# Patient Record
Sex: FEMALE | Race: WHITE | Employment: FULL TIME | URBAN - METROPOLITAN AREA
[De-identification: names, ages, dates, MRNs, and addresses within clinical notes are randomized per-mention and may not be internally consistent; named-entity substitution may affect disease eponyms.]

---

## 2017-01-30 ENCOUNTER — OFFICE VISIT (OUTPATIENT)
Dept: FAMILY MEDICINE CLINIC | Age: 27
End: 2017-01-30

## 2017-01-30 VITALS
RESPIRATION RATE: 16 BRPM | HEIGHT: 61 IN | DIASTOLIC BLOOD PRESSURE: 72 MMHG | TEMPERATURE: 98.1 F | WEIGHT: 127 LBS | HEART RATE: 75 BPM | SYSTOLIC BLOOD PRESSURE: 108 MMHG | BODY MASS INDEX: 23.98 KG/M2 | OXYGEN SATURATION: 97 %

## 2017-01-30 DIAGNOSIS — F41.1 GAD (GENERALIZED ANXIETY DISORDER): ICD-10-CM

## 2017-01-30 DIAGNOSIS — R07.9 CHEST PAIN, UNSPECIFIED TYPE: Primary | ICD-10-CM

## 2017-01-30 NOTE — PROGRESS NOTES
Chief Complaint   Patient presents with    Chest Pain     X3 months     1. Have you been to the ER, urgent care clinic since your last visit? Hospitalized since your last visit? Yes 3 weeks ago,[facility] ER,chest pain    2. Have you seen or consulted any other health care providers outside of the [address] [place] [address] since your last visit? Include any pap smears or colon screening.  No

## 2017-01-30 NOTE — PATIENT INSTRUCTIONS
Louise Holbrook MD, [address] [facility] [address]  [address] [facility],4Th Floor  [place], [address] [facility]  Phone: [phone]  Fax: [phone]    [address] [place] [address]  [address] [address],[address]  [place], [address] [place] [address] Street [facility]  Phone: [phone]  Fax: [phone]

## 2017-01-30 NOTE — MR AVS SNAPSHOT
Visit Information Date & Time Provider Department Dept. Phone Encounter #  
 1/30/2017  3:50 PM Benny Jones MD [address] [place] [address] [phone] [id number] Upcoming Health Maintenance Date Due  
 HPV AGE 9Y-34Y (1 of 3 - Female 3 Dose Series) 6/11/2001 DTaP/Tdap/Td series (1 - Tdap) 6/11/2011 PAP AKA CERVICAL CYTOLOGY 6/11/2011 INFLUENZA AGE 9 TO ADULT 8/1/2016 Allergies as of 1/30/2017  Review Complete On: 1/30/2017 By: Mary Rausch LPN Not on File Current Immunizations  Never Reviewed No immunizations on file. Not reviewed this visit You Were Diagnosed With   
  
 Codes Comments Chest pain, unspecified type    -  Primary ICD-10-CM: R07.9 ICD-9-CM: 786.50 Vitals BP Pulse Temp Resp Height(growth percentile) Weight(growth percentile) 108/72 (BP 1 Location: Left arm, BP Patient Position: Sitting) 75 98.1 °F (36.7 °C) (Oral) 16 5' 1.42\" (1.56 m) 127 lb (57.6 kg) SpO2 BMI OB Status Smoking Status 97% 23.67 kg/m2 Having regular periods Never Smoker BMI and BSA Data Body Mass Index Body Surface Area  
 23.67 kg/m 2 1.58 m 2 Preferred Pharmacy Pharmacy Name Phone [id number] [address] [facility] [phone] Your Updated Medication List  
  
   
This list is accurate as of: 1/30/17  5:16 PM.  Always use your most recent med list.  
  
  
  
  
 sertraline 25 mg tablet Commonly known as:  ZOLOFT Take 1 Tab by mouth daily. We Performed the Following AMB POC EKG ROUTINE W/ 12 LEADS, INTER & REP [22691 CPT(R)] Patient Instructions Quinn Fung MD, [place] [address] [address] Phone: [phone] Fax: [address] [facility] [address] [place], [address] [place] [address] Phone: [phone] Fax: [phone] Introducing [facility]! Armen Kapadia introduces Hitsbook patient portal. Now you can access parts of your medical record, email your doctor's office, and request medication refills online. 1. In your internet browser, go to [email or web address] 2. Click on the First Time User? Click Here link in the Sign In box. You will see the New Member Sign Up page. 3. Enter your Hitsbook Access Code exactly as it appears below. You will not need to use this code after youve completed the sign-up process. If you do not sign up before the expiration date, you must request a new code. · Hitsbook Access Code: [email or web address] Expires: 4/30/2017  5:16 PM 
 
4. Enter the last four digits of your Social Security Number (xxxx) and Date of Birth (mm/dd/yyyy) as indicated and click Submit. You will be taken to the next sign-up page. 5. Create a Hitsbook ID. This will be your Hitsbook login ID and cannot be changed, so think of one that is secure and easy to remember. 6. Create a Hitsbook password. You can change your password at any time. 7. Enter your Password Reset Question and Answer. This can be used at a later time if you forget your password. 8. Enter your e-mail address. You will receive e-mail notification when new information is available in [address] 9. Click Sign Up. You can now view and download portions of your medical record. 10. Click the Download Summary menu link to download a portable copy of your medical information. If you have questions, please visit the Frequently Asked Questions section of the Hitsbook website. Remember, Hitsbook is NOT to be used for urgent needs. For medical emergencies, dial 911. Now available from your iPhone and Android! Please provide this summary of care documentation to your next provider. Your primary care clinician is listed as Zechariah Locker. If you have any questions after today's visit, please call [phone].

## 2017-01-30 NOTE — PROGRESS NOTES
Subjective  Sina Olson is an 32 y.o. female . Patient presents for evaluation of chest pain. It has been chronic in nature, on going for several months. Patient has been evaluated here in clinic as well as at the ED. Evaluation has included EKG as well as labs and troponin which per patinet has been normal. She has also been evaluated by GI and states she did not have any abnormal findings. She denies symptoms of GERD. States that she tried prevacid before as instructed by another doctor and it did not help chest pain symptoms. States that she sees a chiropracter and sometimes after manipulations it feels better. Denies shortness of breath. Allergies - reviewed:   Not on File      Medications - reviewed:   Current Outpatient Prescriptions   Medication Sig    sertraline (ZOLOFT) 25 mg tablet Take 1 Tab by mouth daily. No current facility-administered medications for this visit. Past Medical History - reviewed:  Past Medical History   Diagnosis Date    Heart palpitations          Past Surgical History - reviewed:   History reviewed. No pertinent past surgical history. Social History - reviewed:  Social History     Social History    Marital status: SINGLE     Spouse name: N/A    Number of children: N/A    Years of education: N/A     Occupational History    Not on file.      Social History Main Topics    Smoking status: Never Smoker    Smokeless tobacco: Not on file    Alcohol use No    Drug use: No    Sexual activity: No     Other Topics Concern    Not on file     Social History Narrative         Family History - reviewed:  Family History   Problem Relation Age of Onset    No Known Problems Mother     No Known Problems Father     No Known Problems Sister     No Known Problems Brother     No Known Problems Maternal Grandmother     No Known Problems Maternal Grandfather     No Known Problems Paternal Grandmother     No Known Problems Paternal Grandfather Immunizations - reviewed: There is no immunization history on file for this patient. ROS  Review of Systems : negative unless highlighted  CONSTITUTIONAL: fevers. Chills. GI: no nausea or vomiting or abdominal pain   Cards: chest pain. No dyspnea      Physical Exam  Visit Vitals    /72 (BP 1 Location: Left arm, BP Patient Position: Sitting)    Pulse 75    Temp 98.1 °F (36.7 °C) (Oral)    Resp 16    Ht 5' 1.42\" (1.56 m)    Wt 127 lb (57.6 kg)    SpO2 97%    BMI 23.67 kg/m2       General appearance - NAD. Appropriately conversational  Mouth: Oral mucosa moist, no oral ulcers  Heart - Normal rate, regular rhythm. No m/r/r  Lungs - CTAB. No wheeze or rales   Abdomen - Soft, non tender. Non distended. Extremities - No LE edema. Distal pulses intact  Neuro - Sensation intact in left arm. MSK - ROM intact in upper and lower extremities. Strength 5/5 in upper extremities. Skin - normal coloration and normal turgor. No cyanosis, no rash. Assessment/Plan  1. Chest pain, unspecified type- Long standing history of reported chest pain. Patient has had multiple workups, including in office examinations and EKGs, EKGs in the ED as well as lab work, all of which has been normal. She also has been evaluated by GI and per patient, was told she did not have anything to be concerned about from a GI perspective. Etiology seems unclear but EKG has been normal in office, along with young age and lack of major risk factors it makes cardiac issues unlikely. Patient does have anxiety and has been compliant with zoloft. - EKG in office normal, no abnormalities  - Labs: will check ESR, CRP, to evaluate for musculoskeletal etiologies  - Will refer to Cardiology for further evaluation. May benefit from holter monitor.  - Number for cardiology (Dr. Coty Powers office) provided to patient in AVS as well. I discussed the aforementioned diagnoses with the patient as well as the plan of care.      Lori Crigler Mohit Medel MD  Family Medicine Resident  PGY 2

## 2017-01-31 ENCOUNTER — LAB ONLY (OUTPATIENT)
Dept: FAMILY MEDICINE CLINIC | Age: 27
End: 2017-01-31

## 2017-02-01 LAB
BUN SERPL-MCNC: 13 MG/DL (ref 6–20)
BUN/CREAT SERPL: 16 (ref 8–20)
CALCIUM SERPL-MCNC: 9.6 MG/DL (ref 8.7–10.2)
CHLORIDE SERPL-SCNC: 101 MMOL/L (ref 96–106)
CHOLEST SERPL-MCNC: 236 MG/DL (ref 100–199)
CO2 SERPL-SCNC: 22 MMOL/L (ref 18–29)
CREAT SERPL-MCNC: 0.8 MG/DL (ref 0.57–1)
CRP SERPL-MCNC: <0.3 MG/L (ref 0–4.9)
ERYTHROCYTE [DISTWIDTH] IN BLOOD BY AUTOMATED COUNT: 13 % (ref 12.3–15.4)
ERYTHROCYTE [SEDIMENTATION RATE] IN BLOOD BY WESTERGREN METHOD: 2 MM/HR (ref 0–32)
GLUCOSE SERPL-MCNC: 89 MG/DL (ref 65–99)
HCT VFR BLD AUTO: 40.7 % (ref 34–46.6)
HDLC SERPL-MCNC: 69 MG/DL
HGB BLD-MCNC: 13.7 G/DL (ref 11.1–15.9)
INTERPRETATION, 910389: NORMAL
LDLC SERPL CALC-MCNC: 153 MG/DL (ref 0–99)
MCH RBC QN AUTO: 30.1 PG (ref 26.6–33)
MCHC RBC AUTO-ENTMCNC: 33.7 G/DL (ref 31.5–35.7)
MCV RBC AUTO: 90 FL (ref 79–97)
PLATELET # BLD AUTO: 350 X10E3/UL (ref 150–379)
POTASSIUM SERPL-SCNC: 4.5 MMOL/L (ref 3.5–5.2)
RBC # BLD AUTO: 4.55 X10E6/UL (ref 3.77–5.28)
SODIUM SERPL-SCNC: 139 MMOL/L (ref 134–144)
TRIGL SERPL-MCNC: 69 MG/DL (ref 0–149)
VLDLC SERPL CALC-MCNC: 14 MG/DL (ref 5–40)
WBC # BLD AUTO: 8.1 X10E3/UL (ref 3.4–10.8)

## 2017-02-08 NOTE — PROGRESS NOTES
Labs reviewed. Voicemail left for patient--okay via her from last visit. Everything is normal with exception of hypercholesterolemia: ASCVD 10 year risk is still extremely low at only 0.1%. Labs reassuring regarding overall health and concern for chest pain; however she should keep cardiology follow up and make appt to be seen her after that appointment.

## 2017-02-16 ENCOUNTER — TELEPHONE (OUTPATIENT)
Dept: FAMILY MEDICINE CLINIC | Age: 27
End: 2017-02-16

## 2017-02-16 NOTE — TELEPHONE ENCOUNTER
Per call from Nik Hernandez with Cardiology of [facility] [address] [facility],  appt is 2/17/17 at 7:00 am    Need notes/labs/EKG    Dr. Shabnam Padilla    Fax [phone]      Referral order on file          Nik Hernandez notes that [facility] and they didn't accept her insurance, but they do    [facility]    ID [id number]  GROUP [id number]

## 2017-03-03 DIAGNOSIS — F41.9 ANXIETY: ICD-10-CM

## 2017-03-03 RX ORDER — SERTRALINE HYDROCHLORIDE 25 MG/1
TABLET, FILM COATED ORAL
Qty: 30 TAB | Refills: 5 | Status: SHIPPED | OUTPATIENT
Start: 2017-03-03 | End: 2017-08-02 | Stop reason: SDUPTHER

## 2017-08-02 DIAGNOSIS — F41.9 ANXIETY: ICD-10-CM

## 2017-08-02 RX ORDER — SERTRALINE HYDROCHLORIDE 25 MG/1
TABLET, FILM COATED ORAL
Qty: 30 TAB | Refills: 0 | Status: SHIPPED | OUTPATIENT
Start: 2017-08-02 | End: 2017-08-22 | Stop reason: SDUPTHER

## 2017-08-02 NOTE — TELEPHONE ENCOUNTER
Patient is overdue for follow up of anxiety and depression. Plese have her make an appointment with a provider here.

## 2017-08-22 ENCOUNTER — OFFICE VISIT (OUTPATIENT)
Dept: FAMILY MEDICINE CLINIC | Age: 27
End: 2017-08-22

## 2017-08-22 VITALS
BODY MASS INDEX: 28.7 KG/M2 | TEMPERATURE: 98 F | HEIGHT: 61 IN | WEIGHT: 152 LBS | HEART RATE: 65 BPM | RESPIRATION RATE: 16 BRPM | SYSTOLIC BLOOD PRESSURE: 134 MMHG | OXYGEN SATURATION: 100 % | DIASTOLIC BLOOD PRESSURE: 74 MMHG

## 2017-08-22 DIAGNOSIS — F41.1 GAD (GENERALIZED ANXIETY DISORDER): Primary | ICD-10-CM

## 2017-08-22 DIAGNOSIS — F40.00 AGORAPHOBIA: ICD-10-CM

## 2017-08-22 RX ORDER — SERTRALINE HYDROCHLORIDE 25 MG/1
TABLET, FILM COATED ORAL
Qty: 90 TAB | Refills: 2 | Status: SHIPPED | OUTPATIENT
Start: 2017-08-22 | End: 2018-07-23 | Stop reason: SDUPTHER

## 2017-08-22 NOTE — PATIENT INSTRUCTIONS
Social Phobia: Care Instructions  Your Care Instructions  Social phobia causes a fear of social situations. It is also called social anxiety disorder. People with this condition have trouble talking or meeting with people. They may have a hard time performing in front of others. They worry that they will embarrass themselves. And they worry that others will  them and think poorly of them. Social phobia is not the same as being shy. Nor is it the same as a normal nervous reaction to public speaking. It causes a much higher level of fear. It often starts days or weeks before an event. This condition often triggers symptoms such as blushing, sweating, shakiness, fast heartbeat, and trouble thinking. It can make you feel anxious, sad, cranky or grumpy. You may be easily startled before or during a social event. You may worry or fear that something bad is going to happen. Social phobia can have a strong effect on your daily life. It may even cause you to withdraw from social settings. This can lead you to miss work or school. Social phobia can be treated with medicine and counseling. Follow-up care is a key part of your treatment and safety. Be sure to make and go to all appointments, and call your doctor if you are having problems. It's also a good idea to know your test results and keep a list of the medicines you take. How can you care for yourself at home? · Find a counselor you like and trust. Talk openly and honestly about your problems. Be willing to make some changes. · Be safe with medicines. Take your medicines exactly as prescribed. Call your doctor if you have any problems with your medicine. When you feel good, you may think you do not need your medicine, but it is important to keep taking it. · You may be able to reduce your phobia at home by practicing a healthy lifestyle. ¨ Get at least 30 minutes of exercise on most days of the week. Walking is a good choice.  You also may want to do other activities, such as running, swimming, cycling, or playing tennis or team sports. ¨ Go to bed at nearly the same time every night. And keep your room quiet and dark. This will reduce distractions and help you get a good night's rest.  ¨ Eat a balanced diet by choosing foods low in fat and high in fiber. ¨ Avoid food and drinks that contain caffeine, such as chocolate and coffee. Caffeine may make your phobia worse. ¨ Try some relaxation exercises. Certain breathing exercises and muscle relaxation exercises help reduce anxiety. · Stay active. Try to do the things you usually enjoy doing, even if you don't feel like doing them. · Discuss the cause of your fears with a good friend or family member. Or join a support group for people with problems like yours. Sharing feelings with others sometimes relieves fear. · When you start to feel fearful, do something to get your mind off it, such as taking a walk. · Trust that you can improve your way of coping with these fears. You can feel better. What should you do if someone in your family has a phobia? · Learn about the phobia and signs that symptoms are getting worse. · Remind your family member of your love. Speak honestly with him or her. · Make a plan with all family members about how to take care of your loved one when his or her fears are bad. Talk about your concerns and those of other family members. · Do not focus attention only on the family member who is in treatment. · Remind yourself that it will take time for changes to occur. · Do not blame yourself for his or her condition. · Know your legal rights and the legal rights of your family member. · Take care of yourself. Stay involved with your own interests, such as your career, hobbies, and friends. · Use exercise, positive self-talk, relaxation, and deep breathing exercises to help lower your stress.   · If you are having a hard time with your feelings and your interactions with your family member, talk with a counselor. When should you call for help? Call 911 anytime you think you may need emergency care. For example, call if:  · Someone you know is about to attempt or is attempting suicide. · You feel you cannot stop from hurting yourself or someone else. Call your doctor now or seek immediate medical care if:  · A person with a phobia mentions suicide. If a suicide threat seems real, with a specific plan and a way to carry it out, you or someone you trust should stay with the person until you get help. · Anxiety or irrational fear upsets your daily activities. · Sudden, severe attacks of fear or anxiety with physical symptoms (shaking, sweating) seem to occur for no reason. · You start to use illegal drugs or drink alcohol heavily. Watch closely for changes in your health, and be sure to contact your doctor if:  · You do not get better as expected. Where can you learn more? Go to http://benigno-enrique.info/. Enter 21 [phone] in the search box to learn more about \"Social Phobia: Care Instructions. \"  Current as of: July 26, 2016  Content Version: 11.3  © 4984-4205 [employer]. Care instructions adapted under license by [employer] (which disclaims liability or warranty for this information). If you have questions about a medical condition or this instruction, always ask your healthcare professional. [address] any warranty or liability for your use of this information.

## 2017-08-22 NOTE — PROGRESS NOTES
Subjective  Mary Grace Farrell is an 32 y.o. female . Patient presents for follow up depression/MARYAM, . She is on sertraline 25mg daily. She feels this has really helped with her mode. No problem with HI/SI. Sleep: better sleep  Interest: working for [employer]. So happy about having a job  Guilt: none  Energy: improved  Concentration: good  Appetite: improved \"gained weight actually\"  Psychomotor: none  Suicide: none    She feels that the zoloft has worked really well for her. She says her mood is much improved. She denies prominent anxiety. At times she does have social anxiety, but that has been long standing for her. She does not have any concerns about the medications and feels very hopeful about life overall. Allergies - reviewed:   No Known Allergies      Medications - reviewed:   Current Outpatient Prescriptions   Medication Sig    sertraline (ZOLOFT) 25 mg tablet TAKE 1 TABLET BY MOUTH EVERY DAY     No current facility-administered medications for this visit. Past Medical History - reviewed:  Past Medical History:   Diagnosis Date    Heart palpitations          Immunizations - reviewed: There is no immunization history on file for this patient. ROS  Review of Systems : A complete review of systems as performed and is negative except for those mentioned in the HPI. Physical Exam  Visit Vitals    /74    Pulse 65    Temp 98 °F (36.7 °C) (Oral)    Resp 16    Ht 5' 1.42\" (1.56 m)    Wt 152 lb (68.9 kg)    LMP 08/08/2017    SpO2 100%    BMI 28.33 kg/m2       General appearance - Alert, NAD. Respiratory - LCTAB. No wheeze/rale/rhonchi  Heart - Normal rate, regular rhythm. No m/r/r  Thyroid - non tender, non enlarged, no nodules  Skin - normal coloration and normal turgor. No cyanosis, no rash. Psych- affect \"happy\" mood is happy and pleasant as well. Speech non pressured. Non tangential. No HI/SI . Assessment/Plan  1.  MARYAM (generalized anxiety disorder): this dose seems to be working well for patient. No adjustments today. - continue zoloft 25mg  - Previously ordered one but patient did not complete as at that time she reported previously normal TSH in recent months. I do not see records of this lab result. Will have labs from that provider sent in her. 2. Agoraphobia: improved on zoloft. Continue. If worsens, then we could try buspar, but patient does not feel she needs it now. I discussed the aforementioned diagnoses with the patient as well as the plan of care.      Kayla Gaytan MD  Family Medicine Resident  PGY 3

## 2017-08-22 NOTE — MR AVS SNAPSHOT
Visit Information Date & Time Provider Department Dept. Phone Encounter #  
 8/22/2017  1:45 PM Minor Maria MD [address] [place] [address] [phone] [id number] Upcoming Health Maintenance Date Due DTaP/Tdap/Td series (1 - Tdap) 6/11/2011 PAP AKA CERVICAL CYTOLOGY 6/11/2011 INFLUENZA AGE 9 TO ADULT 8/1/2017 Allergies as of 8/22/2017  Review Complete On: 8/22/2017 By: Elsy Hill LPN No Known Allergies Current Immunizations  Never Reviewed No immunizations on file. Not reviewed this visit You Were Diagnosed With   
  
 Codes Comments MARYAM (generalized anxiety disorder)    -  Primary ICD-10-CM: F41.1 ICD-9-CM: 300.02 Agoraphobia     ICD-10-CM: F40.00 ICD-9-CM: 300.22 Vitals BP Pulse Temp Resp Height(growth percentile) Weight(growth percentile) 134/74 65 98 °F (36.7 °C) (Oral) 16 5' 1.42\" (1.56 m) 152 lb (68.9 kg) LMP SpO2 BMI OB Status Smoking Status 08/08/2017 100% 28.33 kg/m2 Having regular periods Never Smoker BMI and BSA Data Body Mass Index Body Surface Area  
 28.33 kg/m 2 1.73 m 2 Preferred Pharmacy Pharmacy Name Phone [id number]- [place], [address] AT [facility] [phone] Your Updated Medication List  
  
   
This list is accurate as of: 8/22/17  1:56 PM.  Always use your most recent med list.  
  
  
  
  
 sertraline 25 mg tablet Commonly known as:  ZOLOFT  
TAKE 1 TABLET BY MOUTH EVERY DAY Patient Instructions Social Phobia: Care Instructions Your Care Instructions Social phobia causes a fear of social situations. It is also called social anxiety disorder. People with this condition have trouble talking or meeting with people. They may have a hard time performing in front of others. They worry that they will embarrass themselves.  And they worry that others will  them and think poorly of them. Social phobia is not the same as being shy. Nor is it the same as a normal nervous reaction to public speaking. It causes a much higher level of fear. It often starts days or weeks before an event. This condition often triggers symptoms such as blushing, sweating, shakiness, fast heartbeat, and trouble thinking. It can make you feel anxious, sad, cranky or grumpy. You may be easily startled before or during a social event. You may worry or fear that something bad is going to happen. Social phobia can have a strong effect on your daily life. It may even cause you to withdraw from social settings. This can lead you to miss work or school. Social phobia can be treated with medicine and counseling. Follow-up care is a key part of your treatment and safety. Be sure to make and go to all appointments, and call your doctor if you are having problems. It's also a good idea to know your test results and keep a list of the medicines you take. How can you care for yourself at home? · Find a counselor you like and trust. Talk openly and honestly about your problems. Be willing to make some changes. · Be safe with medicines. Take your medicines exactly as prescribed. Call your doctor if you have any problems with your medicine. When you feel good, you may think you do not need your medicine, but it is important to keep taking it. · You may be able to reduce your phobia at home by practicing a healthy lifestyle. ¨ Get at least 30 minutes of exercise on most days of the week. Walking is a good choice. You also may want to do other activities, such as running, swimming, cycling, or playing tennis or team sports. ¨ Go to bed at nearly the same time every night. And keep your room quiet and dark. This will reduce distractions and help you get a good night's rest. 
¨ Eat a balanced diet by choosing foods low in fat and high in fiber. ¨ Avoid food and drinks that contain caffeine, such as chocolate and coffee. Caffeine may make your phobia worse. ¨ Try some relaxation exercises. Certain breathing exercises and muscle relaxation exercises help reduce anxiety. · Stay active. Try to do the things you usually enjoy doing, even if you don't feel like doing them. · Discuss the cause of your fears with a good friend or family member. Or join a support group for people with problems like yours. Sharing feelings with others sometimes relieves fear. · When you start to feel fearful, do something to get your mind off it, such as taking a walk. · Trust that you can improve your way of coping with these fears. You can feel better. What should you do if someone in your family has a phobia? · Learn about the phobia and signs that symptoms are getting worse. · Remind your family member of your love. Speak honestly with him or her. · Make a plan with all family members about how to take care of your loved one when his or her fears are bad. Talk about your concerns and those of other family members. · Do not focus attention only on the family member who is in treatment. · Remind yourself that it will take time for changes to occur. · Do not blame yourself for his or her condition. · Know your legal rights and the legal rights of your family member. · Take care of yourself. Stay involved with your own interests, such as your career, hobbies, and friends. · Use exercise, positive self-talk, relaxation, and deep breathing exercises to help lower your stress. · If you are having a hard time with your feelings and your interactions with your family member, talk with a counselor. When should you call for help? Call 911 anytime you think you may need emergency care. For example, call if: 
· Someone you know is about to attempt or is attempting suicide. · You feel you cannot stop from hurting yourself or someone else. Call your doctor now or seek immediate medical care if: · A person with a phobia mentions suicide. If a suicide threat seems real, with a specific plan and a way to carry it out, you or someone you trust should stay with the person until you get help. · Anxiety or irrational fear upsets your daily activities. · Sudden, severe attacks of fear or anxiety with physical symptoms (shaking, sweating) seem to occur for no reason. · You start to use illegal drugs or drink alcohol heavily. Watch closely for changes in your health, and be sure to contact your doctor if: 
· You do not get better as expected. Where can you learn more? Go to http://benignoZamzeeenrique.info/. Enter 21 [phone] in the search box to learn more about \"Social Phobia: Care Instructions. \" Current as of: July 26, 2016 Content Version: 11.3 © 0125-3650 [employer]. Care instructions adapted under license by [employer] (which disclaims liability or warranty for this information). If you have questions about a medical condition or this instruction, always ask your healthcare professional. [address] any warranty or liability for your use of this information. Introducing [facility]! Jayro Hope introduces [employer] patient portal. Now you can access parts of your medical record, email your doctor's office, and request medication refills online. 1. In your internet browser, go to [email or web address] 2. Click on the First Time User? Click Here link in the Sign In box. You will see the New Member Sign Up page. 3. Enter your [employer] Access Code exactly as it appears below. You will not need to use this code after youve completed the sign-up process. If you do not sign up before the expiration date, you must request a new code. · [employer] Access Code: [email or web address] Expires: 11/20/2017  1:56 PM 
 
 4. Enter the last four digits of your Social Security Number (xxxx) and Date of Birth (mm/dd/yyyy) as indicated and click Submit. You will be taken to the next sign-up page. 5. Create a [employer] ID. This will be your [employer] login ID and cannot be changed, so think of one that is secure and easy to remember. 6. Create a [employer] password. You can change your password at any time. 7. Enter your Password Reset Question and Answer. This can be used at a later time if you forget your password. 8. Enter your e-mail address. You will receive e-mail notification when new information is available in [address] 9. Click Sign Up. You can now view and download portions of your medical record. 10. Click the Download Summary menu link to download a portable copy of your medical information. If you have questions, please visit the Frequently Asked Questions section of the [employer] website. Remember, [employer] is NOT to be used for urgent needs. For medical emergencies, dial 911. Now available from your iPhone and Android! Please provide this summary of care documentation to your next provider. Your primary care clinician is listed as Wayne Velasquez. If you have any questions after today's visit, please call [phone].

## 2017-11-29 ENCOUNTER — OFFICE VISIT (OUTPATIENT)
Dept: FAMILY MEDICINE CLINIC | Age: 27
End: 2017-11-29

## 2017-11-29 VITALS
OXYGEN SATURATION: 100 % | TEMPERATURE: 98.1 F | RESPIRATION RATE: 18 BRPM | HEIGHT: 61 IN | BODY MASS INDEX: 29.45 KG/M2 | HEART RATE: 69 BPM | SYSTOLIC BLOOD PRESSURE: 111 MMHG | WEIGHT: 156 LBS | DIASTOLIC BLOOD PRESSURE: 79 MMHG

## 2017-11-29 DIAGNOSIS — M72.2 PLANTAR FASCIITIS OF RIGHT FOOT: Primary | ICD-10-CM

## 2017-11-29 RX ORDER — IBUPROFEN 600 MG/1
600 TABLET ORAL 3 TIMES DAILY
Qty: 63 TAB | Refills: 1 | Status: SHIPPED | OUTPATIENT
Start: 2017-11-29

## 2017-11-29 NOTE — PROGRESS NOTES
HPI       Chief Complaint   Patient presents with    Foot Swelling     Right foot,X2 weeks, numbness     She is a 32 y.o. female who presents for evalution. 2 weeks of foot pain, bilateral but mainly on the right side. Pain is in the heel, radiates forwards. She relates that she just started a new job 2 weeks ago and is now on her feet ~12 hours a day where before she was not on her feet as much. She also relates she is wearing cheap black shoes. Pain is bad when she gets up out of bed in the morning. Has tried heat and 400 mg Ibuprofen 1-2x per day, nothing else. Yesterday she feels her heel was a little swollen. Review of Systems - No fevers, chills, nausea, vomiting. Reviewed PmHx, RxHx, FmHx, SocHx, AllgHx and updated and dated in the chart. Physical Exam:  Visit Vitals    /79 (BP 1 Location: Right arm, BP Patient Position: Sitting)    Pulse 69    Temp 98.1 °F (36.7 °C) (Oral)    Resp 18    Ht 5' 1.42\" (1.56 m)    Wt 156 lb (70.8 kg)    SpO2 100%    BMI 29.07 kg/m2     Physical Exam   Constitutional: She is oriented to person, place, and time. She appears well-developed and well-nourished. HENT:   Head: Normocephalic and atraumatic. Nose: Nose normal.   Eyes: Conjunctivae are normal. Right eye exhibits no discharge. Left eye exhibits no discharge. No scleral icterus. Cardiovascular: Normal rate, regular rhythm and normal heart sounds. Exam reveals no gallop and no friction rub. Pulmonary/Chest: Effort normal and breath sounds normal. No respiratory distress. Abdominal: Soft. Bowel sounds are normal. She exhibits no distension. There is no tenderness. Musculoskeletal: Normal range of motion. She exhibits tenderness (Heels mildly tender anteriorly bilaterally, more on R.). She exhibits no edema or deformity. DP/PT pulses normal B/L. Neurological: She is alert and oriented to person, place, and time. Skin: Skin is warm and dry. No rash noted.  She is not diaphoretic. No erythema. No pallor. Psychiatric: She has a normal mood and affect. Judgment normal.   Vitals reviewed. Assessment / Plan     Diagnoses and all orders for this visit:    1. Plantar fasciitis of right foot  -     ibuprofen (MOTRIN) 600 mg tablet; Take 1 Tab by mouth three (3) times daily.   - Discussed better shoes. - Ice   - No walking at home with bare feet. - Printout with exercises. Follow-up Disposition:  Return if symptoms worsen or fail to improve. I have discussed the diagnosis with the patient and the intended plan as seen in the above orders. The patient has received an after-visit summary and questions were answered concerning future plans. I have discussed medication side effects and warnings with the patient as well.     Alyson Valdez MD  Family Medicine Resident

## 2017-11-29 NOTE — PATIENT INSTRUCTIONS
1. Cold  2. Ibuprofen three times a day for 2 weeks then as needed after. 3. Very supportive shoes like Danscos, available at medical places that sell scrubs, etc.   4. Exercises printed below. 5. No walking in bare feet - thick slippers at home, etc. (except when showering)     Plantar Fasciitis: Exercises  Your Care Instructions  Here are some examples of typical rehabilitation exercises for your condition. Start each exercise slowly. Ease off the exercise if you start to have pain. Your doctor or physical therapist will tell you when you can start these exercises and which ones will work best for you. How to do the exercises  Towel stretch    1. Sit with your legs extended and knees straight. 2. Place a towel around your foot just under the toes. 3. Hold each end of the towel in each hand, with your hands above your knees. 4. Pull back with the towel so that your foot stretches toward you. 5. Hold the position for at least 15 to 30 seconds. 6. Repeat 2 to 4 times a session, up to 5 sessions a day. Calf stretch    This exercise stretches the muscles at the back of the lower leg (the calf) and the Achilles tendon. Do this exercise 3 or 4 times a day, 5 days a week. 1. Stand facing a wall with your hands on the wall at about eye level. Put the leg you want to stretch about a step behind your other leg. 2. Keeping your back heel on the floor, bend your front knee until you feel a stretch in the back leg. 3. Hold the stretch for 15 to 30 seconds. Repeat 2 to 4 times. Plantar fascia and calf stretch    Stretching the plantar fascia and calf muscles can increase flexibility and decrease heel pain. You can do this exercise several times each day and before and after activity. 1. Stand on a step as shown above. Be sure to hold on to the banister. 2. Slowly let your heels down over the edge of the step as you relax your calf muscles.  You should feel a gentle stretch across the bottom of your foot and up the back of your leg to your knee. 3. Hold the stretch about 15 to 30 seconds, and then tighten your calf muscle a little to bring your heel back up to the level of the step. Repeat 2 to 4 times. Towel curls    Make this exercise more challenging by placing a weighted object, such as a soup can, on the other end of the towel. 1. While sitting, place your foot on a towel on the floor and scrunch the towel toward you with your toes. 2. Then, also using your toes, push the towel away from you. [place] pickups    1. Put marbles on the floor next to a cup.  2. Using your toes, try to lift the marbles up from the floor and put them in the cup. Follow-up care is a key part of your treatment and safety. Be sure to make and go to all appointments, and call your doctor if you are having problems. It's also a good idea to know your test results and keep a list of the medicines you take. Where can you learn more? Go to http://benigno-enrique.info/. Erik Pozo in the search box to learn more about \"Plantar Fasciitis: Exercises. \"  Current as of: March 21, 2017  Content Version: 11.4  © 9719-2338 [employer]. Care instructions adapted under license by [employer] (which disclaims liability or warranty for this information). If you have questions about a medical condition or this instruction, always ask your healthcare professional. [place] [address] any warranty or liability for your use of this information. Plantar Fasciitis: Care Instructions  Your Care Instructions    Plantar fasciitis is pain and inflammation of the plantar fascia, the tissue at the bottom of your foot that connects the heel bone to the toes. The plantar fascia also supports the arch. If you strain the plantar fascia, it can develop small tears and cause heel pain when you stand or walk. Plantar fasciitis can be caused by running or other sports.  It also may occur in people who are overweight or who have high arches or flat feet. You may get plantar fasciitis if you walk or stand for long periods, or have a tight Achilles tendon or calf muscles. You can improve your foot pain with rest and other care at home. It might take a few weeks to a few months for your foot to heal completely. Follow-up care is a key part of your treatment and safety. Be sure to make and go to all appointments, and call your doctor if you are having problems. It's also a good idea to know your test results and keep a list of the medicines you take. How can you care for yourself at home? · Rest your feet often. Reduce your activity to a level that lets you avoid pain. If possible, do not run or walk on hard surfaces. · Take pain medicines exactly as directed. ¨ If the doctor gave you a prescription medicine for pain, take it as prescribed. ¨ If you are not taking a prescription pain medicine, take an over-the-counter anti-inflammatory medicine for pain and swelling, such as ibuprofen (Advil, Motrin) or naproxen (Aleve). Read and follow all instructions on the label. · Use ice massage to help with pain and swelling. You can use an ice cube or an ice cup several times a day. To make an ice cup, fill a paper cup with water and freeze it. Cut off the top of the cup until a half-inch of ice shows. Hold onto the remaining paper to use the cup. Rub the ice in small circles over the area for 5 to 7 minutes. · Contrast baths, which alternate hot and cold water, can also help reduce swelling. But because heat alone may make pain and swelling worse, end a contrast bath with a soak in cold water. · Wear a night splint if your doctor suggests it. A night splint holds your foot with the toes pointed up and the foot and ankle at a 90-degree angle. This position gives the bottom of your foot a constant, gentle stretch.   · Do simple exercises such as calf stretches and towel stretches 2 to 3 times each day, especially when you first get up in the morning. These can help the plantar fascia become more flexible. They also make the muscles that support your arch stronger. Hold these stretches for 15 to 30 seconds per stretch. Repeat 2 to 4 times. ¨ Stand about 1 foot from a wall. Place the palms of both hands against the wall at chest level. Lean forward against the wall, keeping one leg with the knee straight and heel on the ground while bending the knee of the other leg. ¨ Sit down on the floor or a mat with your feet stretched in front of you. Roll up a towel lengthwise, and loop it over the ball of your foot. Holding the towel at both ends, gently pull the towel toward you to stretch your foot. · Wear shoes with good arch support. Athletic shoes or shoes with a well-cushioned sole are good choices. · Try heel cups or shoe inserts (orthotics) to help cushion your heel. You can buy these at many shoe stores. · Put on your shoes as soon as you get out of bed. Going barefoot or wearing slippers may make your pain worse. · Reach and stay at a good weight for your height. This puts less strain on your feet. When should you call for help? Call your doctor now or seek immediate medical care if:  · You have heel pain with fever, redness, or warmth in your heel. · You cannot put weight on the sore foot. Watch closely for changes in your health, and be sure to contact your doctor if:  · You have numbness or tingling in your heel. · Your heel pain lasts more than 2 weeks. Where can you learn more? Go to http://benigno-enrique.info/. Maximilian Sharma in the search box to learn more about \"Plantar Fasciitis: Care Instructions. \"  Current as of: March 21, 2017  Content Version: 11.4  © 3413-8441 [employer]. Care instructions adapted under license by [employer] (which disclaims liability or warranty for this information).  If you have questions about a medical condition or this instruction, always ask your healthcare professional. [address] any warranty or liability for your use of this information. Arch Pain: Exercises  Your Care Instructions  Here are some examples of typical rehabilitation exercises for your condition. Start each exercise slowly. Ease off the exercise if you start to have pain. Your doctor or physical therapist will tell you when you can start these exercises and which ones will work best for you. How to do the exercises  Plantar fascia stretch    7. Sit in a chair and put your affected foot on your other knee. 8. Hold the heel of your foot in one hand, and grasp your toes with the other hand. 9. Pull on your heel (toward your body), and at the same time pull your toes back with your other hand. 10. You should feel a stretch along the bottom of your foot. 11. Hold 15 to 30 seconds. 12. Repeat 2 to 4 times. Plantar fascia stretch (kneeling)    You may want to place a pillow under your knees for this exercise. 4. Get on your hands and knees on the floor. Keep your heels pointing up and the balls of your feet and your toes on the floor. 5. Slowly sit back toward your ankles. 6. If this is too hard, you can try doing it one leg at a time. Stand up, and then kneel on one knee and keep the other leg forward. Place the foot of your forward leg flat on the ground and bend that knee. The heel on the leg still behind you should point up. The ball and toes of that foot should be on the floor. Sit back toward that ankle. 7. Hold 15 to 30 seconds. 8. Repeat 2 to 4 times. Switch legs if you are doing this one leg at a time. Plantar fascia self-massage    4. Sit in a chair. 5. Place your affected foot on a firm, tube-shaped object, such as a can or water bottle. 6. Roll your foot back and forth over the object to massage the bottom of your foot.   7. If you want to do ice massage, fill a water bottle about three-fourths of the way full and freeze before using.  8. Continue for 2 to 5 minutes. Bilateral calf stretch (knees straight)    3. Place a book on the floor a few inches from a wall or countertop, and put the balls of your feet on it. Your heels should be on the floor. The book needs to be thick enough so that you can feel a gentle stretch in each calf. If you are not steady on your feet, hold on to a chair, counter, or wall while you do this stretch. 4. Keep your knees straight, and lean forward until you feel a stretch in each calf. 5. To get more stretch, add another book or use a thicker book, such as a phone book, a dictionary, or an encyclopedia. 6. Hold the stretch for at least 15 to 30 seconds. 7. Repeat 2 to 4 times. Bilateral calf stretch (knees bent)    3. Place a book on the floor a few inches from a wall or countertop, and put the balls of your feet on it. Your heels should be on the floor. The book needs to be thick enough so that you can feel a gentle stretch in each calf. If you are not steady on your feet, hold on to a chair, counter, or wall while you do this stretch. 4. Bend your knees, and lean forward until you feel a stretch in each calf. 5. To get more stretch, add another book or use a thicker book, such as a phone book, a dictionary, or an encyclopedia. 6. Hold the stretch for at least 15 to 30 seconds. 7. Repeat 2 to 4 times. [place] pick-ups    1. Put some marbles on the floor next to a cup.  2. Sit down, and use the toes of your affected foot to lift up one marble from the floor at a time. Then try to put the marble in the cup.  3. Repeat 8 to 12 times. Towel scrunches    1. Sit down, and place your affected foot on a towel on the floor. You may also do this with both feet on the towel. 2. Scrunch the towel toward you with your toes. Then use your toes to push the towel back into place. 3. Repeat 8 to 12 times. Heel raises on a step    1. Stand on the bottom step of a staircase, facing up toward the stairs.  Put the balls of your feet on the step. If you are not steady on your feet, hold on to the banister or wall. 2. Keeping both knees straight, slowly lift your heels above the step so that you are standing on your toes. Then slowly lower your heels below the step and toward the floor. 3. Return to the starting position, with your feet even with the step. 4. Repeat 8 to 12 times. Follow-up care is a key part of your treatment and safety. Be sure to make and go to all appointments, and call your doctor if you are having problems. It's also a good idea to know your test results and keep a list of the medicines you take. Where can you learn more? Go to http://benigno-enrique.info/. Enter H119 in the search box to learn more about \"Arch Pain: Exercises. \"  Current as of: March 21, 2017  Content Version: 11.4  © 8498-8130 [employer]. Care instructions adapted under license by [employer] (which disclaims liability or warranty for this information). If you have questions about a medical condition or this instruction, always ask your healthcare professional. [place] [address] any warranty or liability for your use of this information.

## 2017-11-29 NOTE — LETTER
NOTIFICATION RETURN TO WORK / SCHOOL 
 
11/29/2017 11:41 AM 
 
Ms. Carson Dakins [address]  [phone] [address] [place] [id number] To Whom It May Concern: 
 
Carson Dakins is currently under the care of [address]. She will return to work/school on: 11/30/17 If there are questions or concerns please have the patient contact our office.  
 
 
 
Sincerely, 
 
 
Janeth Guillermo MD

## 2017-11-29 NOTE — PROGRESS NOTES
Chief Complaint   Patient presents with    Foot Swelling     Right foot,X2 weeks, numbness     1. Have you been to the ER, urgent care clinic since your last visit? Hospitalized since your last visit? No    2. Have you seen or consulted any other health care providers outside of the [address] [place] [address] since your last visit? Include any pap smears or colon screening.  No

## 2017-11-29 NOTE — MR AVS SNAPSHOT
Visit Information Date & Time Provider Department Dept. Phone Encounter #  
 11/29/2017 10:55 AM Ingrid Mccormick MD [facility] [phone] [id number] Follow-up Instructions Return if symptoms worsen or fail to improve. Upcoming Health Maintenance Date Due DTaP/Tdap/Td series (1 - Tdap) 6/11/2011 PAP AKA CERVICAL CYTOLOGY 6/11/2011 Influenza Age 5 to Adult 8/1/2017 Allergies as of 11/29/2017  Review Complete On: 11/29/2017 By: Mariana Polanco LPN No Known Allergies Current Immunizations  Never Reviewed No immunizations on file. Not reviewed this visit You Were Diagnosed With   
  
 Codes Comments Plantar fasciitis of right foot    -  Primary ICD-10-CM: M72.2 ICD-9-CM: 728.71 Vitals BP Pulse Temp Resp Height(growth percentile) Weight(growth percentile) 111/79 (BP 1 Location: Right arm, BP Patient Position: Sitting) 69 98.1 °F (36.7 °C) (Oral) 18 5' 1.42\" (1.56 m) 156 lb (70.8 kg) SpO2 BMI OB Status Smoking Status 100% 29.07 kg/m2 Having regular periods Never Smoker Vitals History BMI and BSA Data Body Mass Index Body Surface Area  
 29.07 kg/m 2 1.75 m 2 Preferred Pharmacy Pharmacy Name Phone [id number], [address] AT [facility] [phone] Your Updated Medication List  
  
   
This list is accurate as of: 11/29/17 11:39 AM.  Always use your most recent med list.  
  
  
  
  
 ibuprofen 600 mg tablet Commonly known as:  MOTRIN Take 1 Tab by mouth three (3) times daily. sertraline 25 mg tablet Commonly known as:  ZOLOFT  
TAKE 1 TABLET BY MOUTH EVERY DAY Prescriptions Sent to Pharmacy Refills  
 ibuprofen (MOTRIN) 600 mg tablet 1 Sig: Take 1 Tab by mouth three (3) times daily. Class: Normal  
 Pharmacy[id number] [place], [address]  AT [facility] #: [phone] Route: Oral  
  
Follow-up Instructions Return if symptoms worsen or fail to improve. Patient Instructions 1. Cold 2. Ibuprofen three times a day for 2 weeks then as needed after. 3. Very supportive shoes like Danscos, available at medical places that sell scrubs, etc.  
4. Exercises printed below. 5. No walking in bare feet - thick slippers at home, etc. (except when showering) Plantar Fasciitis: Exercises Your Care Instructions Here are some examples of typical rehabilitation exercises for your condition. Start each exercise slowly. Ease off the exercise if you start to have pain. Your doctor or physical therapist will tell you when you can start these exercises and which ones will work best for you. How to do the exercises Towel stretch 1. Sit with your legs extended and knees straight. 2. Place a towel around your foot just under the toes. 3. Hold each end of the towel in each hand, with your hands above your knees. 4. Pull back with the towel so that your foot stretches toward you. 5. Hold the position for at least 15 to 30 seconds. 6. Repeat 2 to 4 times a session, up to 5 sessions a day. Calf stretch This exercise stretches the muscles at the back of the lower leg (the calf) and the Achilles tendon. Do this exercise 3 or 4 times a day, 5 days a week. 1. Stand facing a wall with your hands on the wall at about eye level. Put the leg you want to stretch about a step behind your other leg. 2. Keeping your back heel on the floor, bend your front knee until you feel a stretch in the back leg. 3. Hold the stretch for 15 to 30 seconds. Repeat 2 to 4 times. Plantar fascia and calf stretch Stretching the plantar fascia and calf muscles can increase flexibility and decrease heel pain. You can do this exercise several times each day and before and after activity. 1. Stand on a step as shown above. Be sure to hold on to the banister. 2. Slowly let your heels down over the edge of the step as you relax your calf muscles. You should feel a gentle stretch across the bottom of your foot and up the back of your leg to your knee. 3. Hold the stretch about 15 to 30 seconds, and then tighten your calf muscle a little to bring your heel back up to the level of the step. Repeat 2 to 4 times. Towel curls Make this exercise more challenging by placing a weighted object, such as a soup can, on the other end of the towel. 1. While sitting, place your foot on a towel on the floor and scrunch the towel toward you with your toes. 2. Then, also using your toes, push the towel away from you. [place] pickups 1. Put marbles on the floor next to a cup. 
2. Using your toes, try to lift the marbles up from the floor and put them in the cup. Follow-up care is a key part of your treatment and safety. Be sure to make and go to all appointments, and call your doctor if you are having problems. It's also a good idea to know your test results and keep a list of the medicines you take. Where can you learn more? Go to http://benigno-enrique.info/. Nadine Montero in the search box to learn more about \"Plantar Fasciitis: Exercises. \" Current as of: March 21, 2017 Content Version: 11.4 © 6267-3270 Zonit [employer]ured Solutions. Care instructions adapted under license by Strategic Blue (which disclaims liability or warranty for this information). If you have questions about a medical condition or this instruction, always ask your healthcare professional. [address] any warranty or liability for your use of this information. Plantar Fasciitis: Care Instructions Your Care Instructions Plantar fasciitis is pain and inflammation of the plantar fascia, the tissue at the bottom of your foot that connects the heel bone to the toes. The plantar fascia also supports the arch.  If you strain the plantar fascia, it can develop small tears and cause heel pain when you stand or walk. Plantar fasciitis can be caused by running or other sports. It also may occur in people who are overweight or who have high arches or flat feet. You may get plantar fasciitis if you walk or stand for long periods, or have a tight Achilles tendon or calf muscles. You can improve your foot pain with rest and other care at home. It might take a few weeks to a few months for your foot to heal completely. Follow-up care is a key part of your treatment and safety. Be sure to make and go to all appointments, and call your doctor if you are having problems. It's also a good idea to know your test results and keep a list of the medicines you take. How can you care for yourself at home? · Rest your feet often. Reduce your activity to a level that lets you avoid pain. If possible, do not run or walk on hard surfaces. · Take pain medicines exactly as directed. ¨ If the doctor gave you a prescription medicine for pain, take it as prescribed. ¨ If you are not taking a prescription pain medicine, take an over-the-counter anti-inflammatory medicine for pain and swelling, such as ibuprofen (Advil, Motrin) or naproxen (Aleve). Read and follow all instructions on the label. · Use ice massage to help with pain and swelling. You can use an ice cube or an ice cup several times a day. To make an ice cup, fill a paper cup with water and freeze it. Cut off the top of the cup until a half-inch of ice shows. Hold onto the remaining paper to use the cup. Rub the ice in small circles over the area for 5 to 7 minutes. · Contrast baths, which alternate hot and cold water, can also help reduce swelling. But because heat alone may make pain and swelling worse, end a contrast bath with a soak in cold water. · Wear a night splint if your doctor suggests it.  A night splint holds your foot with the toes pointed up and the foot and ankle at a 90-degree angle. This position gives the bottom of your foot a constant, gentle stretch. · Do simple exercises such as calf stretches and towel stretches 2 to 3 times each day, especially when you first get up in the morning. These can help the plantar fascia become more flexible. They also make the muscles that support your arch stronger. Hold these stretches for 15 to 30 seconds per stretch. Repeat 2 to 4 times. ¨ Stand about 1 foot from a wall. Place the palms of both hands against the wall at chest level. Lean forward against the wall, keeping one leg with the knee straight and heel on the ground while bending the knee of the other leg. ¨ Sit down on the floor or a mat with your feet stretched in front of you. Roll up a towel lengthwise, and loop it over the ball of your foot. Holding the towel at both ends, gently pull the towel toward you to stretch your foot. · Wear shoes with good arch support. Athletic shoes or shoes with a well-cushioned sole are good choices. · Try heel cups or shoe inserts (orthotics) to help cushion your heel. You can buy these at many shoe stores. · Put on your shoes as soon as you get out of bed. Going barefoot or wearing slippers may make your pain worse. · Reach and stay at a good weight for your height. This puts less strain on your feet. When should you call for help? Call your doctor now or seek immediate medical care if: 
· You have heel pain with fever, redness, or warmth in your heel. · You cannot put weight on the sore foot. Watch closely for changes in your health, and be sure to contact your doctor if: 
· You have numbness or tingling in your heel. · Your heel pain lasts more than 2 weeks. Where can you learn more? Go to http://benigno-enrique.info/. [employer] Heart in the search box to learn more about \"Plantar Fasciitis: Care Instructions. \" Current as of: March 21, 2017 Content Version: 11.4 © 9077-8897 Healthwise, [employer]. Care instructions adapted under license by ePub Direct (which disclaims liability or warranty for this information). If you have questions about a medical condition or this instruction, always ask your healthcare professional. [address] any warranty or liability for your use of this information. Arch Pain: Exercises Your Care Instructions Here are some examples of typical rehabilitation exercises for your condition. Start each exercise slowly. Ease off the exercise if you start to have pain. Your doctor or physical therapist will tell you when you can start these exercises and which ones will work best for you. How to do the exercises Plantar fascia stretch 7. Sit in a chair and put your affected foot on your other knee. 8. Hold the heel of your foot in one hand, and grasp your toes with the other hand. 9. Pull on your heel (toward your body), and at the same time pull your toes back with your other hand. 10. You should feel a stretch along the bottom of your foot. 11. Hold 15 to 30 seconds. 12. Repeat 2 to 4 times. Plantar fascia stretch (kneeling) You may want to place a pillow under your knees for this exercise. 4. Get on your hands and knees on the floor. Keep your heels pointing up and the balls of your feet and your toes on the floor. 5. Slowly sit back toward your ankles. 6. If this is too hard, you can try doing it one leg at a time. Stand up, and then kneel on one knee and keep the other leg forward. Place the foot of your forward leg flat on the ground and bend that knee. The heel on the leg still behind you should point up. The ball and toes of that foot should be on the floor. Sit back toward that ankle. 7. Hold 15 to 30 seconds. 8. Repeat 2 to 4 times. Switch legs if you are doing this one leg at a time. Plantar fascia self-massage 4. Sit in a chair. 5. Place your affected foot on a firm, tube-shaped object, such as a can or water bottle. 6. Roll your foot back and forth over the object to massage the bottom of your foot. 7. If you want to do ice massage, fill a water bottle about three-fourths of the way full and freeze before using. 8. Continue for 2 to 5 minutes. Bilateral calf stretch (knees straight) 3. Place a book on the floor a few inches from a wall or countertop, and put the balls of your feet on it. Your heels should be on the floor. The book needs to be thick enough so that you can feel a gentle stretch in each calf. If you are not steady on your feet, hold on to a chair, counter, or wall while you do this stretch. 4. Keep your knees straight, and lean forward until you feel a stretch in each calf. 5. To get more stretch, add another book or use a thicker book, such as a phone book, a dictionary, or an encyclopedia. 6. Hold the stretch for at least 15 to 30 seconds. 7. Repeat 2 to 4 times. Bilateral calf stretch (knees bent) 3. Place a book on the floor a few inches from a wall or countertop, and put the balls of your feet on it. Your heels should be on the floor. The book needs to be thick enough so that you can feel a gentle stretch in each calf. If you are not steady on your feet, hold on to a chair, counter, or wall while you do this stretch. 4. Bend your knees, and lean forward until you feel a stretch in each calf. 5. To get more stretch, add another book or use a thicker book, such as a phone book, a dictionary, or an encyclopedia. 6. Hold the stretch for at least 15 to 30 seconds. 7. Repeat 2 to 4 times. [place] pick-ups 1. Put some marbles on the floor next to a cup. 
2. Sit down, and use the toes of your affected foot to lift up one marble from the floor at a time. Then try to put the marble in the cup. 
3. Repeat 8 to 12 times. Towel scrunches 1. Sit down, and place your affected foot on a towel on the floor. You may also do this with both feet on the towel. 2. Scrunch the towel toward you with your toes. Then use your toes to push the towel back into place. 3. Repeat 8 to 12 times. Heel raises on a step 1. Stand on the bottom step of a staircase, facing up toward the stairs. Put the balls of your feet on the step. If you are not steady on your feet, hold on to the banister or wall. 2. Keeping both knees straight, slowly lift your heels above the step so that you are standing on your toes. Then slowly lower your heels below the step and toward the floor. 3. Return to the starting position, with your feet even with the step. 4. Repeat 8 to 12 times. Follow-up care is a key part of your treatment and safety. Be sure to make and go to all appointments, and call your doctor if you are having problems. It's also a good idea to know your test results and keep a list of the medicines you take. Where can you learn more? Go to http://benigno-enrique.info/. Enter H119 in the search box to learn more about \"Arch Pain: Exercises. \" Current as of: March 21, 2017 Content Version: 11.4 © 3101-6061 Healthwise, Incorporated. Care instructions adapted under license by BlogBus (which disclaims liability or warranty for this information). If you have questions about a medical condition or this instruction, always ask your healthcare professional. [address] any warranty or liability for your use of this information. Introducing [facility]! Jarrod Aguilar introduces PAIEON patient portal. Now you can access parts of your medical record, email your doctor's office, and request medication refills online. 1. In your internet browser, go to [email or web address] 2. Click on the First Time User? Click Here link in the Sign In box. You will see the New Member Sign Up page. 3. Enter your theBench Access Code exactly as it appears below. You will not need to use this code after youve completed the sign-up process. If you do not sign up before the expiration date, you must request a new code. · theBench Access Code: [email or web address] Expires: 2/27/2018 11:26 AM 
 
4. Enter the last four digits of your Social Security Number (xxxx) and Date of Birth (mm/dd/yyyy) as indicated and click Submit. You will be taken to the next sign-up page. 5. Create a theBench ID. This will be your theBench login ID and cannot be changed, so think of one that is secure and easy to remember. 6. Create a theBench password. You can change your password at any time. 7. Enter your Password Reset Question and Answer. This can be used at a later time if you forget your password. 8. Enter your e-mail address. You will receive e-mail notification when new information is available in [address]. 9. Click Sign Up. You can now view and download portions of your medical record. 10. Click the Download Summary menu link to download a portable copy of your medical information. If you have questions, please visit the Frequently Asked Questions section of the theBench website. Remember, theBench is NOT to be used for urgent needs. For medical emergencies, dial 911. Now available from your iPhone and Android! Please provide this summary of care documentation to your next provider. Your primary care clinician is listed as Mandeep Shea. If you have any questions after today's visit, please call [phone].

## 2018-01-12 ENCOUNTER — OFFICE VISIT (OUTPATIENT)
Dept: FAMILY MEDICINE CLINIC | Age: 28
End: 2018-01-12

## 2018-01-12 VITALS
OXYGEN SATURATION: 97 % | DIASTOLIC BLOOD PRESSURE: 69 MMHG | WEIGHT: 154 LBS | TEMPERATURE: 97.9 F | HEART RATE: 68 BPM | BODY MASS INDEX: 29.07 KG/M2 | HEIGHT: 61 IN | SYSTOLIC BLOOD PRESSURE: 109 MMHG | RESPIRATION RATE: 16 BRPM

## 2018-01-12 DIAGNOSIS — N30.00 ACUTE CYSTITIS WITHOUT HEMATURIA: Primary | ICD-10-CM

## 2018-01-12 LAB
BILIRUB UR QL STRIP: NEGATIVE
GLUCOSE UR-MCNC: NEGATIVE MG/DL
HCG URINE, QL. (POC): NEGATIVE
KETONES P FAST UR STRIP-MCNC: NEGATIVE MG/DL
PH UR STRIP: 5.5 [PH] (ref 4.6–8)
PROT UR QL STRIP: NEGATIVE
SP GR UR STRIP: 1.02 (ref 1–1.03)
UA UROBILINOGEN AMB POC: NORMAL (ref 0.2–1)
URINALYSIS CLARITY POC: NORMAL
URINALYSIS COLOR POC: YELLOW
URINE BLOOD POC: NORMAL
URINE LEUKOCYTES POC: NORMAL
URINE NITRITES POC: NEGATIVE
VALID INTERNAL CONTROL?: YES

## 2018-01-12 RX ORDER — NITROFURANTOIN 25; 75 MG/1; MG/1
100 CAPSULE ORAL 2 TIMES DAILY
Qty: 10 CAP | Refills: 0 | Status: SHIPPED | OUTPATIENT
Start: 2018-01-12 | End: 2018-07-23 | Stop reason: ALTCHOICE

## 2018-01-12 NOTE — MR AVS SNAPSHOT
Visit Information Date & Time Provider Department Dept. Phone Encounter #  
 1/12/2018  8:30 AM Deepti Falk MD [address] [place] [address] [phone] [id number] Follow-up Instructions Return if symptoms worsen or fail to improve. Upcoming Health Maintenance Date Due DTaP/Tdap/Td series (1 - Tdap) 6/11/2011 PAP AKA CERVICAL CYTOLOGY 6/11/2011 Influenza Age 5 to Adult 8/1/2017 Allergies as of 1/12/2018  Review Complete On: 1/12/2018 By: Deepti Falk MD  
 No Known Allergies Current Immunizations  Never Reviewed No immunizations on file. Not reviewed this visit You Were Diagnosed With   
  
 Codes Comments Acute cystitis without hematuria    -  Primary ICD-10-CM: N30.00 ICD-9-CM: 595.0 Vitals BP Pulse Temp Resp Height(growth percentile) Weight(growth percentile) 109/69 68 97.9 °F (36.6 °C) (Oral) 16 5' 1.42\" (1.56 m) 154 lb (69.9 kg) LMP SpO2 BMI OB Status Smoking Status 12/19/2017 97% 28.7 kg/m2 Having regular periods Never Smoker BMI and BSA Data Body Mass Index Body Surface Area 28.7 kg/m 2 1.74 m 2 Preferred Pharmacy Pharmacy Name Phone [id number] [address] [facility] [phone] Your Updated Medication List  
  
   
This list is accurate as of: 1/12/18  9:13 AM.  Always use your most recent med list.  
  
  
  
  
 ibuprofen 600 mg tablet Commonly known as:  MOTRIN Take 1 Tab by mouth three (3) times daily. nitrofurantoin (macrocrystal-monohydrate) 100 mg capsule Commonly known as:  MACROBID Take 1 Cap by mouth two (2) times a day. sertraline 25 mg tablet Commonly known as:  ZOLOFT  
TAKE 1 TABLET BY MOUTH EVERY DAY Prescriptions Sent to Pharmacy Refills  
 nitrofurantoin, macrocrystal-monohydrate, (MACROBID) 100 mg capsule 0 Sig: Take 1 Cap by mouth two (2) times a day. Class: Normal  
 Pharmacy: [address] #: [phone] Route: Oral  
  
We Performed the Following AMB POC URINALYSIS DIP STICK AUTO W/O MICRO [57288 CPT(R)] AMB POC URINE PREGNANCY TEST, VISUAL COLOR COMPARISON [30978 CPT(R)] CULTURE, URINE [id number] CPT(R)] Follow-up Instructions Return if symptoms worsen or fail to improve. Patient Instructions Urinary Tract Infection in Women: Care Instructions Your Care Instructions A urinary tract infection, or UTI, is a general term for an infection anywhere between the kidneys and the urethra (where urine comes out). Most UTIs are bladder infections. They often cause pain or burning when you urinate. UTIs are caused by bacteria and can be cured with antibiotics. Be sure to complete your treatment so that the infection goes away. Follow-up care is a key part of your treatment and safety. Be sure to make and go to all appointments, and call your doctor if you are having problems. It's also a good idea to know your test results and keep a list of the medicines you take. How can you care for yourself at home? · Take your antibiotics as directed. Do not stop taking them just because you feel better. You need to take the full course of antibiotics. · Drink extra water and other fluids for the next day or two. This may help wash out the bacteria that are causing the infection. (If you have kidney, heart, or liver disease and have to limit fluids, talk with your doctor before you increase your fluid intake.) · Avoid drinks that are carbonated or have caffeine. They can irritate the bladder. · Urinate often. Try to empty your bladder each time. · To relieve pain, take a hot bath or lay a heating pad set on low over your lower belly or genital area. Never go to sleep with a heating pad in place. To prevent UTIs · Drink plenty of water each day. This helps you urinate often, which clears bacteria from your system. (If you have kidney, heart, or liver disease and have to limit fluids, talk with your doctor before you increase your fluid intake.) · Urinate when you need to. · Urinate right after you have sex. · Change sanitary pads often. · Avoid douches, bubble baths, feminine hygiene sprays, and other feminine hygiene products that have deodorants. · After going to the bathroom, wipe from front to back. When should you call for help? Call your doctor now or seek immediate medical care if: 
? · Symptoms such as fever, chills, nausea, or vomiting get worse or appear for the first time. ? · You have new pain in your back just below your rib cage. This is called flank pain. ? · There is new blood or pus in your urine. ? · You have any problems with your antibiotic medicine. ? Watch closely for changes in your health, and be sure to contact your doctor if: 
? · You are not getting better after taking an antibiotic for 2 days. ? · Your symptoms go away but then come back. Where can you learn more? Go to http://benigno-enrique.info/. Enter K906 in the search box to learn more about \"Urinary Tract Infection in Women: Care Instructions. \" Current as of: May 12, 2017 Content Version: 11.4 © 3292-8708 Leadjini. Care instructions adapted under license by Ajungo (which disclaims liability or warranty for this information). If you have questions about a medical condition or this instruction, always ask your healthcare professional. [address] any warranty or liability for your use of this information. Introducing [facility]! Kasia Roland introduces ChartITright patient portal. Now you can access parts of your medical record, email your doctor's office, and request medication refills online.    
 
1. In your internet browser, go to [email or web address] 2. Click on the First Time User? Click Here link in the Sign In box. You will see the New Member Sign Up page. 3. Enter your LuxVue Technology Access Code exactly as it appears below. You will not need to use this code after youve completed the sign-up process. If you do not sign up before the expiration date, you must request a new code. · LuxVue Technology Access Code: [email or web address] Expires: 2/27/2018 11:26 AM 
 
4. Enter the last four digits of your Social Security Number (xxxx) and Date of Birth (mm/dd/yyyy) as indicated and click Submit. You will be taken to the next sign-up page. 5. Create a LuxVue Technology ID. This will be your LuxVue Technology login ID and cannot be changed, so think of one that is secure and easy to remember. 6. Create a LuxVue Technology password. You can change your password at any time. 7. Enter your Password Reset Question and Answer. This can be used at a later time if you forget your password. 8. Enter your e-mail address. You will receive e-mail notification when new information is available in [address] 9. Click Sign Up. You can now view and download portions of your medical record. 10. Click the Download Summary menu link to download a portable copy of your medical information. If you have questions, please visit the Frequently Asked Questions section of the LuxVue Technology website. Remember, LuxVue Technology is NOT to be used for urgent needs. For medical emergencies, dial 911. Now available from your iPhone and Android! Please provide this summary of care documentation to your next provider. Your primary care clinician is listed as [facility]. If you have any questions after today's visit, please call [phone].

## 2018-01-12 NOTE — PROGRESS NOTES
Subjective:   Kimani Miller is an 32 y.o. female who presents for frequent urination. HPI  Symptoms of urinary frequency associated with suprapubic pressure and fullness started 3 days ago. She was taking OTC cranberry extract w/o any improvement. No burning, no pain with urination. No fever, no chills. LMP 12/19/2017     Allergies - reviewed:   No Known Allergies      Medications - reviewed:  Current Outpatient Prescriptions   Medication Sig    nitrofurantoin, macrocrystal-monohydrate, (MACROBID) 100 mg capsule Take 1 Cap by mouth two (2) times a day.  ibuprofen (MOTRIN) 600 mg tablet Take 1 Tab by mouth three (3) times daily.  sertraline (ZOLOFT) 25 mg tablet TAKE 1 TABLET BY MOUTH EVERY DAY     No current facility-administered medications for this visit. Past Medical History - reviewed:  Past Medical History:   Diagnosis Date    Heart palpitations              Family History - reviewed:  Family History   Problem Relation Age of Onset    No Known Problems Mother     No Known Problems Father     No Known Problems Sister     No Known Problems Brother     No Known Problems Maternal Grandmother     No Known Problems Maternal Grandfather     No Known Problems Paternal Grandmother     No Known Problems Paternal Grandfather          Social History - reviewed:  Social History     Social History    Marital status: SINGLE     Spouse name: N/A    Number of children: N/A    Years of education: N/A     Occupational History    Not on file. Social History Main Topics    Smoking status: Never Smoker    Smokeless tobacco: Never Used    Alcohol use No    Drug use: No    Sexual activity: No     Other Topics Concern    Not on file     Social History Narrative         Review of Systems   CONSTITUTIONAL: denies fever. Denies chills. CARDIOVASCULAR: denies chest pain. Denies palpitations  RESPIRATORY: denies shortness of breath  GI: denies abdominal pain. Denies change in stools.  Denies hematochezia/melana  : +Urinary frequency. Objective:     Visit Vitals    /69    Pulse 68    Temp 97.9 °F (36.6 °C) (Oral)    Resp 16    Ht 5' 1.42\" (1.56 m)    Wt 154 lb (69.9 kg)    LMP 12/19/2017    SpO2 97%    BMI 28.7 kg/m2       General appearance - alert, well appearing, and in no distress  Chest - clear to auscultation, no wheezes, rales or rhonchi, symmetric air entry  Heart - normal rate, regular rhythm, normal S1, S2, no murmurs, rubs, clicks or gallops  Abdomen - soft, nontender, nondistended, no masses or organomegaly  Skin - normal coloration and turgor, no rashes, no suspicious skin lesions noted      Assessment:    33 yo female who is here for  UTI. ICD-10-CM ICD-9-CM    1. Acute cystitis without hematuria N30.00 595.0 AMB POC URINALYSIS DIP STICK AUTO W/O MICRO      AMB POC URINE PREGNANCY TEST, VISUAL COLOR COMPARISON      CULTURE, URINE      nitrofurantoin, macrocrystal-monohydrate, (MACROBID) 100 mg capsule           Plan:   · Uncomplicated UTI. UA with 3+ LE. UPT is negative. · Will treat with Macrobid x 5 days  · Urine cx was sent    Follow-up Disposition:  Return if symptoms worsen or fail to improve. I have discussed the diagnosis with the patient and the intended plan as seen in the above orders. The patient has received an after-visit summary and questions were answered concerning future plans. I have discussed medication side effects and warnings with the patient as well. Informed pt to return to the office if new symptoms arise.     The pt was discussed with Dr. Ariane To ( the attending physician)   Iesha Parrish MD   Family Medicine Resident, PGY-2

## 2018-01-12 NOTE — PATIENT INSTRUCTIONS

## 2018-01-15 LAB — BACTERIA UR CULT: ABNORMAL

## 2018-07-23 ENCOUNTER — OFFICE VISIT (OUTPATIENT)
Dept: FAMILY MEDICINE CLINIC | Age: 28
End: 2018-07-23

## 2018-07-23 VITALS
HEIGHT: 61 IN | BODY MASS INDEX: 32.28 KG/M2 | SYSTOLIC BLOOD PRESSURE: 103 MMHG | RESPIRATION RATE: 16 BRPM | WEIGHT: 171 LBS | TEMPERATURE: 98.1 F | DIASTOLIC BLOOD PRESSURE: 69 MMHG | OXYGEN SATURATION: 98 % | HEART RATE: 74 BPM

## 2018-07-23 DIAGNOSIS — E66.9 OBESITY (BMI 30.0-34.9): Primary | ICD-10-CM

## 2018-07-23 DIAGNOSIS — F41.1 GAD (GENERALIZED ANXIETY DISORDER): ICD-10-CM

## 2018-07-23 DIAGNOSIS — F40.00 AGORAPHOBIA: ICD-10-CM

## 2018-07-23 RX ORDER — SERTRALINE HYDROCHLORIDE 25 MG/1
TABLET, FILM COATED ORAL
Qty: 90 TAB | Refills: 2 | Status: SHIPPED | OUTPATIENT
Start: 2018-07-23

## 2018-07-23 NOTE — PROGRESS NOTES
Chief Complaint   Patient presents with    Medication Refill     zoloft     1. Have you been to the ER, urgent care clinic since your last visit? Hospitalized since your last visit? No    2. Have you seen or consulted any other health care providers outside of the [facility] since your last visit? Include any pap smears or colon screening.  No

## 2018-07-23 NOTE — PROGRESS NOTES
[address] [place] [address]    Subjective:     CC: Medication refill  History provided by patient and chart review    HPI:  29 yr old female with hx of MARYAM who presents for medication refill. States she has been on Zoloft 25mg for the past 2 yr and has had adequate symptom control. Denies any medication side effects. Endorses a hx of social anxiety which she states is much improved over the last year. Currently works at [employer] and enjoys her job. Sleep is adequate, no loss of interest, denies any feelings of guilt, has adequate energy throughout the day, concentration, appetite and memory are adequate and unchanged. She denies any etoh or substance abuse suicidal or homicidal ideation. Past Medical History:   Diagnosis Date    Heart palpitations      No Known Allergies  Current Outpatient Prescriptions on File Prior to Visit   Medication Sig Dispense Refill    ibuprofen (MOTRIN) 600 mg tablet Take 1 Tab by mouth three (3) times daily. 63 Tab 1     No current facility-administered medications on file prior to visit. Family History   Problem Relation Age of Onset    No Known Problems Mother     No Known Problems Father     No Known Problems Sister     No Known Problems Brother     No Known Problems Maternal Grandmother     No Known Problems Maternal Grandfather     No Known Problems Paternal Grandmother     No Known Problems Paternal Grandfather      Social History     Social History    Marital status: SINGLE     Spouse name: N/A    Number of children: N/A    Years of education: N/A     Social History Main Topics    Smoking status: Never Smoker    Smokeless tobacco: Never Used    Alcohol use No    Drug use: No    Sexual activity: No     Other Topics Concern    None     Social History Narrative     History reviewed. No pertinent surgical history.     Patient Active Problem List   Diagnosis Code    Anxiety F41.9    MARYAM (generalized anxiety disorder) F41.1    Agoraphobia F40.00 Review of Systems   Cardiovascular: Negative for chest pain and palpitations. Skin: Negative for itching and rash. Neurological: Negative for sensory change, focal weakness and headaches. Psychiatric/Behavioral: Negative for depression, hallucinations, memory loss, substance abuse and suicidal ideas. The patient is not nervous/anxious and does not have insomnia. Objective:     Visit Vitals    /69    Pulse 74    Temp 98.1 °F (36.7 °C) (Oral)    Resp 16    Ht 5' 1.42\" (1.56 m)    Wt 171 lb (77.6 kg)    LMP 07/03/2018    SpO2 98%    BMI 31.87 kg/m2        Physical Exam   Constitutional: She is oriented to person, place, and time. AOX3, NAD   HENT:   Head: Normocephalic and atraumatic. Mouth/Throat: Oropharynx is clear and moist.   Neck: Normal range of motion. Neck supple. No thyromegaly present. Cardiovascular: Normal rate, regular rhythm and normal heart sounds. Neurological: She is alert and oriented to person, place, and time. No cranial nerve deficit. Coordination normal.   Skin: Skin is warm and dry. Psychiatric: She has a normal mood and affect. Her behavior is normal. Thought content normal.       Pertinent Labs/Studies:      Assessment and orders:     Diagnoses and all orders for this visit:    MARYAM (generalized anxiety disorder)  -     Stable: PHQ 9 score of 5 and MARYAM 7 score of 4  -     Will maintain current dose of Zoloft 25mg daily. Refills provided  -     RTO for worsening symptoms or medication side effects    Obesity       -      Encouraged healthy dietary habits and exercise. I have reviewed patient medical and social history and medications. I have reviewed pertinent labs results and other data. I have discussed the diagnosis with the patient and the intended plan as seen in the above orders. The patient has received an after-visit summary and questions were answered concerning future plans.  I have discussed medication side effects and warnings with the patient as well.     Thor Demarco MD  Resident [address] [facility]  07/25/18    Patient discussed with Dr. Tom Stroud, Attending Physician

## 2018-07-23 NOTE — MR AVS SNAPSHOT
[address] 
[phone] Patient: Prem Espinoza MRN: [id number] Visit Information Date & Time Provider Department Dept. Phone Encounter #  
 7/23/2018  4:15 PM Christopher Vasquez  [address] [phone] [id number] Upcoming Health Maintenance Date Due DTaP/Tdap/Td series (1 - Tdap) 6/11/2011 PAP AKA CERVICAL CYTOLOGY 6/11/2011 Influenza Age 5 to Adult 8/1/2018 Allergies as of 7/23/2018  Review Complete On: 7/23/2018 By: Christopehr Vasquez MD  
 No Known Allergies Current Immunizations  Never Reviewed No immunizations on file. Not reviewed this visit You Were Diagnosed With   
  
 Codes Comments MARYAM (generalized anxiety disorder)     ICD-10-CM: F41.1 ICD-9-CM: 300.02 Agoraphobia     ICD-10-CM: F40.00 ICD-9-CM: 300.22 Vitals BP Pulse Temp Resp Height(growth percentile) Weight(growth percentile) 103/69 74 98.1 °F (36.7 °C) (Oral) 16 5' 1.42\" (1.56 m) 171 lb (77.6 kg) LMP SpO2 BMI OB Status Smoking Status 07/03/2018 98% 31.87 kg/m2 Having regular periods Never Smoker Vitals History BMI and BSA Data Body Mass Index Body Surface Area  
 31.87 kg/m 2 1.83 m 2 Preferred Pharmacy Pharmacy Name Phone [id number] [place]  [phone] Your Updated Medication List  
  
   
This list is accurate as of 7/23/18  5:04 PM.  Always use your most recent med list.  
  
  
  
  
 ibuprofen 600 mg tablet Commonly known as:  MOTRIN Take 1 Tab by mouth three (3) times daily. sertraline 25 mg tablet Commonly known as:  ZOLOFT  
TAKE 1 TABLET BY MOUTH EVERY DAY Prescriptions Sent to Pharmacy Refills  
 sertraline (ZOLOFT) 25 mg tablet 2 Sig: TAKE 1 TABLET BY MOUTH EVERY DAY  Class: Normal  
 Pharmacy: [facility] [address] [address], [address] [place]  Ph #: [phone] Introducing [facility]! Karri Kauffman introduces [employer] patient portal. Now you can access parts of your medical record, email your doctor's office, and request medication refills online. 1. In your internet browser, go to [email or web address] 2. Click on the First Time User? Click Here link in the Sign In box. You will see the New Member Sign Up page. 3. Enter your [employer] Access Code exactly as it appears below. You will not need to use this code after youve completed the sign-up process. If you do not sign up before the expiration date, you must request a new code. · [employer] Access Code: [email or web address] Expires: 10/21/2018  5:04 PM 
 
4. Enter the last four digits of your Social Security Number (xxxx) and Date of Birth (mm/dd/yyyy) as indicated and click Submit. You will be taken to the next sign-up page. 5. Create a [employer] ID. This will be your [employer] login ID and cannot be changed, so think of one that is secure and easy to remember. 6. Create a [employer] password. You can change your password at any time. 7. Enter your Password Reset Question and Answer. This can be used at a later time if you forget your password. 8. Enter your e-mail address. You will receive e-mail notification when new information is available in [address]. 9. Click Sign Up. You can now view and download portions of your medical record. 10. Click the Download Summary menu link to download a portable copy of your medical information. If you have questions, please visit the Frequently Asked Questions section of the [employer] website. Remember, [employer] is NOT to be used for urgent needs. For medical emergencies, dial 911. Now available from your iPhone and Android! Please provide this summary of care documentation to your next provider. Your primary care clinician is listed as Miladis Bridges. If you have any questions after today's visit, please call [phone].

## 2019-07-15 ENCOUNTER — HOSPITAL ENCOUNTER (OUTPATIENT)
Dept: CT IMAGING | Age: 29
Discharge: HOME | End: 2019-07-15
Attending: OPHTHALMOLOGY
Payer: COMMERCIAL

## 2019-07-15 DIAGNOSIS — L03.213 PRESEPTAL CELLULITIS: ICD-10-CM

## 2019-07-15 PROCEDURE — 74011636320 HC RX REV CODE- 636/320: Performed by: OPHTHALMOLOGY

## 2019-07-15 PROCEDURE — 70482 CT ORBIT/EAR/FOSSA W/O&W/DYE: CPT

## 2019-07-15 RX ADMIN — IOPAMIDOL 100 ML: 612 INJECTION, SOLUTION INTRAVENOUS at 10:35

## 2019-07-17 ENCOUNTER — OFFICE VISIT (OUTPATIENT)
Dept: FAMILY MEDICINE CLINIC | Age: 29
End: 2019-07-17

## 2019-07-17 VITALS
WEIGHT: 137.2 LBS | HEART RATE: 82 BPM | SYSTOLIC BLOOD PRESSURE: 110 MMHG | BODY MASS INDEX: 25.9 KG/M2 | OXYGEN SATURATION: 98 % | TEMPERATURE: 98.2 F | RESPIRATION RATE: 18 BRPM | DIASTOLIC BLOOD PRESSURE: 74 MMHG | HEIGHT: 61 IN

## 2019-07-17 DIAGNOSIS — R22.0 RIGHT FACIAL SWELLING: ICD-10-CM

## 2019-07-17 DIAGNOSIS — R63.4 UNINTENTIONAL WEIGHT LOSS: Primary | ICD-10-CM

## 2019-07-17 RX ORDER — CETIRIZINE HCL 10 MG
10 TABLET ORAL 2 TIMES DAILY
Qty: 60 TAB | Status: SHIPPED | OUTPATIENT
Start: 2019-07-17

## 2019-07-17 NOTE — PROGRESS NOTES
Health Maintenance Due   Topic Date Due    DTaP/Tdap/Td series (1 - Tdap) 06/11/2011    PAP AKA CERVICAL CYTOLOGY  06/11/2011

## 2019-07-17 NOTE — PROGRESS NOTES
Chief Complaint   Patient presents with    Facial Swelling     Swelling on right side of face 7x in the last month        Flori Moreno is a 34 y.o. female who presents for few month hx of     Pt seen by ophthalmologist (Dr. Edmund Carrera) yesterday who did not find any significant findings. Had neg CT orbits. Pt has tried oral Abx + prednisone (completed meds last week); sx resolved but then restarted 3 days later. She reports this to be a long-term hx of R facial swelling (x7), L eye swelling (once; May). Pt usually evaluated at [facility]. No fevers/chills. No joint/mucle pain. No rash. No eye discharge. No pain w/ EOM. Vision intact. No sinus pain. No personal/FH autoimmune/rheumatologic disease. NKA but never been formally tested. Pt denies recent change in medication/food/cosmetic products/environmental exposure. Pt also reports recent weightloss that she attributes to decreased caloric intake 2/2 stress/frustration w/ facial swelling. PMHx:  Past Medical History:   Diagnosis Date    Heart palpitations        Meds:   Current Outpatient Medications   Medication Sig Dispense Refill    cetirizine (ZYRTEC) 10 mg tablet Take 1 Tab by mouth two (2) times a day. 60 Tab prn    ibuprofen (MOTRIN) 600 mg tablet Take 1 Tab by mouth three (3) times daily.  63 Tab 1    sertraline (ZOLOFT) 25 mg tablet TAKE 1 TABLET BY MOUTH EVERY DAY 90 Tab 2       Allergies:   No Known Allergies    Smoker:  Social History     Tobacco Use   Smoking Status Never Smoker   Smokeless Tobacco Never Used       ETOH:   Social History     Substance and Sexual Activity   Alcohol Use No       FH:   Family History   Problem Relation Age of Onset    No Known Problems Mother     No Known Problems Father     No Known Problems Sister     No Known Problems Brother     No Known Problems Maternal Grandmother     No Known Problems Maternal Grandfather     No Known Problems Paternal Grandmother     No Known Problems Paternal Grandfather ROS:  General/Constitutional:   No headache, fever, fatigue, weight loss or weight gain   HEENT:   As per HPI  Cardiac:    No chest pain      Respiratory:   No cough or shortness of breath     GI:   No nausea/vomiting, diarrhea, abdominal pain, bloody or dark stools       :   No dysuria or  hematuria    Neurological:   No loss of consciousness, dizziness, seizures, dysarthria, cognitive changes, memory changes,  problems with balance, or unilateral weakness     Skin: No rash     Physical Exam:  Visit Vitals  /74 (BP 1 Location: Right arm, BP Patient Position: Sitting)   Pulse 82   Temp 98.2 °F (36.8 °C) (Oral)   Resp 18   Ht 5' 1.42\" (1.56 m)   Wt 137 lb 3.2 oz (62.2 kg)   SpO2 98%   BMI 25.57 kg/m²     Physical Exam   Constitutional: She is oriented to person, place, and time. No distress. HENT:   Head: Normocephalic and atraumatic. R periorbital edema extending to R cheek. L side of face unaffected. No erythema/lesions/rash   Eyes: Pupils are equal, round, and reactive to light. Conjunctivae and EOM are normal. Right eye exhibits no discharge. Left eye exhibits no discharge. No scleral icterus. Neck: Normal range of motion. Neck supple. Cardiovascular: Normal rate, regular rhythm and normal heart sounds. Pulmonary/Chest: Effort normal and breath sounds normal.   Musculoskeletal: Normal range of motion. She exhibits no edema. Neurological: She is alert and oriented to person, place, and time. No cranial nerve deficit. Skin: Skin is warm and dry. No rash noted. She is not diaphoretic. No erythema. Psychiatric: Mood and affect normal.       Assessment:    ICD-10-CM ICD-9-CM    1. Unintentional weight loss R63.4 783.21 TSH REFLEX TO T4      SED RATE (ESR)      PITA BY MULTIPLEX FLOW IA, QL      CRP, HIGH SENSITIVITY      METABOLIC PANEL, COMPREHENSIVE      CBC W/O DIFF      cetirizine (ZYRTEC) 10 mg tablet   2.  Right facial swelling R22.0 784.2 TSH REFLEX TO T4      SED RATE (ESR)      PITA BY MULTIPLEX FLOW IA, QL      CRP, HIGH SENSITIVITY      METABOLIC PANEL, COMPREHENSIVE      CBC W/O DIFF      cetirizine (ZYRTEC) 10 mg tablet       Plan:  Right facial swelling  - TSH REFLEX TO T4  - SED RATE (ESR)  - CRP, HIGH SENSITIVITY  - METABOLIC PANEL, COMPREHENSIVE  - CBC W/O DIFF  - cetirizine (ZYRTEC) 10 mg tablet; Take 1 Tab by mouth two (2) times a day. Dispense: 60 Tab;  Refill: prn  - PITA BY IFA W/REFLEX  - Referral to immunology      Pt seen and discussed w/ Dr. Darel Fothergill, Family Medicine Attending    Betina Delacruz MD  [facility] Medicine Attending

## 2019-07-19 LAB
ALBUMIN SERPL-MCNC: 4.5 G/DL (ref 3.5–5.5)
ALBUMIN/GLOB SERPL: 1.9 {RATIO} (ref 1.2–2.2)
ALP SERPL-CCNC: 64 IU/L (ref 39–117)
ALT SERPL-CCNC: 7 IU/L (ref 0–32)
ANA TITR SER IF: NEGATIVE {TITER}
AST SERPL-CCNC: 12 IU/L (ref 0–40)
BILIRUB SERPL-MCNC: 0.3 MG/DL (ref 0–1.2)
BUN SERPL-MCNC: 10 MG/DL (ref 6–20)
BUN/CREAT SERPL: 14 (ref 9–23)
CALCIUM SERPL-MCNC: 9.5 MG/DL (ref 8.7–10.2)
CHLORIDE SERPL-SCNC: 102 MMOL/L (ref 96–106)
CO2 SERPL-SCNC: 24 MMOL/L (ref 20–29)
CREAT SERPL-MCNC: 0.74 MG/DL (ref 0.57–1)
CRP SERPL HS-MCNC: 0.78 MG/L (ref 0–3)
ERYTHROCYTE [DISTWIDTH] IN BLOOD BY AUTOMATED COUNT: 12.9 % (ref 12.3–15.4)
ERYTHROCYTE [SEDIMENTATION RATE] IN BLOOD BY WESTERGREN METHOD: 7 MM/HR (ref 0–32)
GLOBULIN SER CALC-MCNC: 2.4 G/DL (ref 1.5–4.5)
GLUCOSE SERPL-MCNC: 103 MG/DL (ref 65–99)
HCT VFR BLD AUTO: 40.5 % (ref 34–46.6)
HGB BLD-MCNC: 13.1 G/DL (ref 11.1–15.9)
MCH RBC QN AUTO: 28.9 PG (ref 26.6–33)
MCHC RBC AUTO-ENTMCNC: 32.3 G/DL (ref 31.5–35.7)
MCV RBC AUTO: 89 FL (ref 79–97)
PLATELET # BLD AUTO: 368 X10E3/UL (ref 150–450)
PLEASE NOTE, 734348: NORMAL
POTASSIUM SERPL-SCNC: 4.1 MMOL/L (ref 3.5–5.2)
PROT SERPL-MCNC: 6.9 G/DL (ref 6–8.5)
RBC # BLD AUTO: 4.54 X10E6/UL (ref 3.77–5.28)
SODIUM SERPL-SCNC: 140 MMOL/L (ref 134–144)
TSH SERPL DL<=0.005 MIU/L-ACNC: 1.01 UIU/ML (ref 0.45–4.5)
WBC # BLD AUTO: 7.7 X10E3/UL (ref 3.4–10.8)

## 2019-07-22 NOTE — PROGRESS NOTES
[address] Medicine Residency Attending Addendum:  I saw and evaluated the patient on the day of the encounter with Olivia Yang MD  , performing the key elements of the service. I discussed the findings, assessment and plan with the resident and agree with the resident's findings and plan as documented in the resident's note.       Argelia Remy MD, CAQSM, RMSK

## 2024-05-11 SDOH — HEALTH STABILITY: PHYSICAL HEALTH: ON AVERAGE, HOW MANY DAYS PER WEEK DO YOU ENGAGE IN MODERATE TO STRENUOUS EXERCISE (LIKE A BRISK WALK)?: 2 DAYS

## 2024-05-11 SDOH — HEALTH STABILITY: PHYSICAL HEALTH: ON AVERAGE, HOW MANY MINUTES DO YOU ENGAGE IN EXERCISE AT THIS LEVEL?: 30 MIN

## 2024-05-14 ENCOUNTER — OFFICE VISIT (OUTPATIENT)
Age: 34
End: 2024-05-14
Payer: MEDICAID

## 2024-05-14 VITALS
WEIGHT: 144 LBS | BODY MASS INDEX: 28.27 KG/M2 | SYSTOLIC BLOOD PRESSURE: 96 MMHG | HEIGHT: 60 IN | HEART RATE: 66 BPM | DIASTOLIC BLOOD PRESSURE: 68 MMHG | TEMPERATURE: 98 F | OXYGEN SATURATION: 99 %

## 2024-05-14 DIAGNOSIS — E66.3 OVERWEIGHT (BMI 25.0-29.9): Primary | ICD-10-CM

## 2024-05-14 DIAGNOSIS — Z76.89 ENCOUNTER TO ESTABLISH CARE: ICD-10-CM

## 2024-05-14 PROBLEM — M54.81 CERVICO-OCCIPITAL NEURALGIA OF LEFT SIDE: Status: ACTIVE | Noted: 2022-08-30

## 2024-05-14 PROBLEM — F07.81 POST CONCUSSIVE SYNDROME: Status: ACTIVE | Noted: 2022-08-19

## 2024-05-14 PROBLEM — I60.9 SAH (SUBARACHNOID HEMORRHAGE) (HCC): Status: ACTIVE | Noted: 2022-08-15

## 2024-05-14 PROBLEM — G47.20 CIRCADIAN DYSREGULATION: Status: ACTIVE | Noted: 2022-08-30

## 2024-05-14 LAB
ANION GAP SERPL CALC-SCNC: 3 MMOL/L (ref 5–15)
BUN SERPL-MCNC: 12 MG/DL (ref 6–20)
BUN/CREAT SERPL: 17 (ref 12–20)
CALCIUM SERPL-MCNC: 9.9 MG/DL (ref 8.5–10.1)
CHLORIDE SERPL-SCNC: 108 MMOL/L (ref 97–108)
CHOLEST SERPL-MCNC: 256 MG/DL
CO2 SERPL-SCNC: 27 MMOL/L (ref 21–32)
CREAT SERPL-MCNC: 0.72 MG/DL (ref 0.55–1.02)
GLUCOSE SERPL-MCNC: 82 MG/DL (ref 65–100)
HCV AB SER IA-ACNC: 0.06 INDEX
HCV AB SERPL QL IA: NONREACTIVE
HDLC SERPL-MCNC: 76 MG/DL
HDLC SERPL: 3.4 (ref 0–5)
HIV 1+2 AB+HIV1 P24 AG SERPL QL IA: NONREACTIVE
HIV 1/2 RESULT COMMENT: NORMAL
LDLC SERPL CALC-MCNC: 162.4 MG/DL (ref 0–100)
POTASSIUM SERPL-SCNC: 4.6 MMOL/L (ref 3.5–5.1)
SODIUM SERPL-SCNC: 138 MMOL/L (ref 136–145)
TRIGL SERPL-MCNC: 88 MG/DL
VLDLC SERPL CALC-MCNC: 17.6 MG/DL

## 2024-05-14 PROCEDURE — 99203 OFFICE O/P NEW LOW 30 MIN: CPT

## 2024-05-14 ASSESSMENT — PATIENT HEALTH QUESTIONNAIRE - PHQ9
1. LITTLE INTEREST OR PLEASURE IN DOING THINGS: NOT AT ALL
SUM OF ALL RESPONSES TO PHQ QUESTIONS 1-9: 0
SUM OF ALL RESPONSES TO PHQ9 QUESTIONS 1 & 2: 0
2. FEELING DOWN, DEPRESSED OR HOPELESS: NOT AT ALL
SUM OF ALL RESPONSES TO PHQ QUESTIONS 1-9: 0

## 2024-05-14 ASSESSMENT — ENCOUNTER SYMPTOMS
PHOTOPHOBIA: 1
SHORTNESS OF BREATH: 0
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
## 2024-05-17 NOTE — RESULT ENCOUNTER NOTE
LDL cholesterol elevated to 162.4, HDL cholesterol somewhat protective at 76.    BMP within normal limits.    HIV/hep C nonreactive.    Will communicate results with patient via [employer].

## 2024-07-30 ENCOUNTER — OFFICE VISIT (OUTPATIENT)
Age: 34
End: 2024-07-30
Payer: MEDICAID

## 2024-07-30 VITALS
TEMPERATURE: 98.2 F | HEART RATE: 57 BPM | OXYGEN SATURATION: 98 % | BODY MASS INDEX: 28.12 KG/M2 | DIASTOLIC BLOOD PRESSURE: 66 MMHG | SYSTOLIC BLOOD PRESSURE: 103 MMHG | WEIGHT: 144 LBS

## 2024-07-30 DIAGNOSIS — Z00.00 ANNUAL PHYSICAL EXAM: ICD-10-CM

## 2024-07-30 DIAGNOSIS — E78.00 HYPERCHOLESTEROLEMIA: Primary | ICD-10-CM

## 2024-07-30 PROCEDURE — 99395 PREV VISIT EST AGE 18-39: CPT

## 2024-07-30 ASSESSMENT — PATIENT HEALTH QUESTIONNAIRE - PHQ9
SUM OF ALL RESPONSES TO PHQ QUESTIONS 1-9: 0
1. LITTLE INTEREST OR PLEASURE IN DOING THINGS: NOT AT ALL
SUM OF ALL RESPONSES TO PHQ QUESTIONS 1-9: 0
SUM OF ALL RESPONSES TO PHQ9 QUESTIONS 1 & 2: 0
SUM OF ALL RESPONSES TO PHQ QUESTIONS 1-9: 0
SUM OF ALL RESPONSES TO PHQ QUESTIONS 1-9: 0
2. FEELING DOWN, DEPRESSED OR HOPELESS: NOT AT ALL

## 2024-07-30 NOTE — PROGRESS NOTES
Pt roomed by name and .    Chief Complaint   Patient presents with    Annual Exam     F/U labs        Vitals:    24 1553   BP: 103/66   Pulse: 57   Temp: 98.2 °F (36.8 °C)   TempSrc: Temporal   SpO2: 98%   Weight: 65.3 kg (144 lb)          \"Have you been to the ER, urgent care clinic since your last visit?  Hospitalized since your last visit?\"    NO    “Have you seen or consulted any other health care providers outside of [facility] since your last visit?”    NO                     Click Here for Release of Records Request

## 2024-07-30 NOTE — PROGRESS NOTES
[facility]   [address] [place] [address]   Office [phone], Fax [phone]      Subjective:   Refugio Blackwood is an 34 y.o. female who presents for complete physical exam.    HPI: Doing well. No complaints.    Diet: Appetite is very poor, still having problems with smell and certain foods she likes still taste bad  Exercise: no exercise but is physically active throughout the day  Tobacco use: none  Alcohol use: none  Illicit drug use: none  Sleep:   Sexual activity: not sexually active, no concern for STDs  Menses: regular, every 30 days, last about 5 days, has heavy bleeding and cramps, does not want to be     Health Maintenance   Topic Date Due    Hepatitis B vaccine (1 of 3 - 3-dose series) Never done    COVID-19 Vaccine (1) Never done    Varicella vaccine (1 of 2 - 2-dose childhood series) Never done    Cervical cancer screen  Never done    Flu vaccine (1) 08/01/2024    Depression Screen  07/30/2025    DTaP/Tdap/Td vaccine (2 - Td or Tdap) 08/15/2032    Hepatitis C screen  Completed    HIV screen  Completed    Hepatitis A vaccine  Aged Out    Hib vaccine  Aged Out    HPV vaccine  Aged Out    Polio vaccine  Aged Out    Meningococcal (ACWY) vaccine  Aged Out    Pneumococcal 0-64 years Vaccine  Aged Out       Immunizations, reviewed:   Immunization History   Administered Date(s) Administered    TDaP, ADACEL (age 10y-64y), BOOSTRIX (age 10y+), IM, 0.5mL 08/15/2022     Flu: Due in on month or so   Tdap: UTD    Health Maintenance, reviewed:  Hepatitis C testing: UTD, neg  HIV testing: UTD, neg  Lipids:   Lab Results   Component Value Date    CHOL 256 (H) 05/14/2024    TRIG 88 05/14/2024    HDL 76 05/14/2024    .4 (H) 05/14/2024    VLDL 17.6 05/14/2024    CHOLHDLRATIO 3.4 05/14/2024     Last Pap: UTD, last on 03/2024    Allergies, reviewed:   No Known Allergies    Medications, reviewed:  No current outpatient medications on file.     No current

## 2024-07-31 ASSESSMENT — ENCOUNTER SYMPTOMS: SHORTNESS OF BREATH: 0

## 2024-09-24 SDOH — ECONOMIC STABILITY: INCOME INSECURITY: HOW HARD IS IT FOR YOU TO PAY FOR THE VERY BASICS LIKE FOOD, HOUSING, MEDICAL CARE, AND HEATING?: SOMEWHAT HARD

## 2024-09-24 SDOH — ECONOMIC STABILITY: FOOD INSECURITY: WITHIN THE PAST 12 MONTHS, THE FOOD YOU BOUGHT JUST DIDN'T LAST AND YOU DIDN'T HAVE MONEY TO GET MORE.: SOMETIMES TRUE

## 2024-09-24 SDOH — ECONOMIC STABILITY: FOOD INSECURITY: WITHIN THE PAST 12 MONTHS, YOU WORRIED THAT YOUR FOOD WOULD RUN OUT BEFORE YOU GOT MONEY TO BUY MORE.: SOMETIMES TRUE

## 2024-09-24 SDOH — ECONOMIC STABILITY: TRANSPORTATION INSECURITY
IN THE PAST 12 MONTHS, HAS LACK OF TRANSPORTATION KEPT YOU FROM MEETINGS, WORK, OR FROM GETTING THINGS NEEDED FOR DAILY LIVING?: NO

## 2024-09-27 ENCOUNTER — OFFICE VISIT (OUTPATIENT)
Age: 34
End: 2024-09-27
Payer: MEDICAID

## 2024-09-27 VITALS
HEIGHT: 60 IN | HEART RATE: 59 BPM | BODY MASS INDEX: 29.25 KG/M2 | TEMPERATURE: 97.8 F | OXYGEN SATURATION: 97 % | SYSTOLIC BLOOD PRESSURE: 95 MMHG | RESPIRATION RATE: 18 BRPM | WEIGHT: 149 LBS | DIASTOLIC BLOOD PRESSURE: 61 MMHG

## 2024-09-27 DIAGNOSIS — J30.2 SEASONAL ALLERGIC RHINITIS, UNSPECIFIED TRIGGER: Primary | ICD-10-CM

## 2024-09-27 DIAGNOSIS — E78.00 HYPERCHOLESTEROLEMIA: ICD-10-CM

## 2024-09-27 LAB
CHOLEST SERPL-MCNC: 255 MG/DL
HDLC SERPL-MCNC: 68 MG/DL
HDLC SERPL: 3.8 (ref 0–5)
LDLC SERPL CALC-MCNC: 167.4 MG/DL (ref 0–100)
TRIGL SERPL-MCNC: 98 MG/DL
VLDLC SERPL CALC-MCNC: 19.6 MG/DL

## 2024-09-27 PROCEDURE — 99213 OFFICE O/P EST LOW 20 MIN: CPT

## 2024-09-27 RX ORDER — FLUTICASONE PROPIONATE 50 MCG
2 SPRAY, SUSPENSION (ML) NASAL DAILY
Qty: 16 G | Refills: 3 | Status: SHIPPED | OUTPATIENT
Start: 2024-09-27

## 2024-09-27 RX ORDER — ACETAMINOPHEN 500 MG
500 TABLET ORAL EVERY 6 HOURS PRN
COMMUNITY

## 2024-09-27 RX ORDER — CETIRIZINE HYDROCHLORIDE 10 MG/1
10 TABLET ORAL DAILY
Qty: 90 TABLET | Refills: 1 | Status: SHIPPED | OUTPATIENT
Start: 2024-09-27

## 2024-09-27 ASSESSMENT — ENCOUNTER SYMPTOMS
FACIAL SWELLING: 0
RHINORRHEA: 1
SINUS PAIN: 0
COUGH: 1
SINUS PRESSURE: 0

## 2024-09-27 ASSESSMENT — PATIENT HEALTH QUESTIONNAIRE - PHQ9
1. LITTLE INTEREST OR PLEASURE IN DOING THINGS: NOT AT ALL
SUM OF ALL RESPONSES TO PHQ QUESTIONS 1-9: 0
SUM OF ALL RESPONSES TO PHQ9 QUESTIONS 1 & 2: 0
SUM OF ALL RESPONSES TO PHQ QUESTIONS 1-9: 0
2. FEELING DOWN, DEPRESSED OR HOPELESS: NOT AT ALL

## 2024-10-01 ENCOUNTER — PATIENT MESSAGE (OUTPATIENT)
Age: 34
End: 2024-10-01
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
## 2024-10-15 ENCOUNTER — OFFICE VISIT (OUTPATIENT)
Age: 34
End: 2024-10-15
Payer: MEDICAID

## 2024-10-15 VITALS
HEIGHT: 60 IN | DIASTOLIC BLOOD PRESSURE: 66 MMHG | HEART RATE: 66 BPM | BODY MASS INDEX: 29.06 KG/M2 | SYSTOLIC BLOOD PRESSURE: 96 MMHG | RESPIRATION RATE: 18 BRPM | OXYGEN SATURATION: 96 % | WEIGHT: 148 LBS | TEMPERATURE: 97.7 F

## 2024-10-15 DIAGNOSIS — R42 LIGHTHEADEDNESS: Primary | ICD-10-CM

## 2024-10-15 PROCEDURE — 99213 OFFICE O/P EST LOW 20 MIN: CPT

## 2024-10-15 ASSESSMENT — PATIENT HEALTH QUESTIONNAIRE - PHQ9
1. LITTLE INTEREST OR PLEASURE IN DOING THINGS: NOT AT ALL
SUM OF ALL RESPONSES TO PHQ QUESTIONS 1-9: 0
2. FEELING DOWN, DEPRESSED OR HOPELESS: NOT AT ALL
SUM OF ALL RESPONSES TO PHQ QUESTIONS 1-9: 0
SUM OF ALL RESPONSES TO PHQ9 QUESTIONS 1 & 2: 0

## 2024-10-15 NOTE — PROGRESS NOTES
Patient has been identified by name and .    Chief Complaint   Patient presents with    Allergic Rhinitis      Pt reports to F/U on dizziness  Pt feels better but still dizziess , no apetite  Left hand swelling and pain, limited ROM       Vitals:    10/15/24 0903 10/15/24 0928 10/15/24 0930 10/15/24 0931   BP: (!) 92/58 107/72 109/75 96/66   Site: Left Upper Arm Right Upper Arm Left Upper Arm Left Upper Arm   Position: Sitting Supine Sitting Standing   Cuff Size: Medium Adult Medium Adult Medium Adult Medium Adult   Pulse: 63 60 60 66   Resp: 18      Temp: 97.7 °F (36.5 °C)      TempSrc: Oral      SpO2: 96%      Weight: 67.1 kg (148 lb)      Height: 1.524 m (5')           \"Have you been to the ER, urgent care clinic since your last visit?  Hospitalized since your last visit?\"    [facility] for [facility]    “Have you seen or consulted any other health care providers outside of [facility] since your last visit?”    NO        “Have you had a pap smear?”    NO    No cervical cancer screening on file

## 2024-10-15 NOTE — PROGRESS NOTES
[facility]   [address] [place] [address]   Office [phone], Fax [phone]      Subjective   Refugio Blackwood is a 34 y.o. female who presents for Allergic Rhinitis  (Pt reports to F/U on dizziness/Pt feels better but still dizziess , no apetite/Left hand swelling and pain, limited ROM)      HPI  - Seen in clinic last for seasonal allergic rhinitis  - Subsequently developed dizziness and lightheadedness that persisted for several days, advised to go to ER given history of intracranial hemorrhage  - She reached out to her neurologist but their offices were full, did not go to ER  - trying to schedule appointment with neurologist  - Today still having occasional dizziness, can be when she is sitting still  - getting neck adjustments from her chiropractor  - feels that appetite is lower than normal     Review of Systems   Review of Systems   Constitutional:  Negative for chills and fever.   Neurological:  Positive for dizziness and light-headedness. Negative for tremors, syncope, speech difficulty, weakness and headaches.     Medical History  Past Medical History:   Diagnosis Date   • Heart palpitations      Medications  Current Outpatient Medications   Medication Sig   • acetaminophen (TYLENOL) 500 MG tablet Take 1 tablet by mouth every 6 hours as needed for Pain As needed   • fluticasone (FLONASE) 50 MCG/ACT nasal spray 2 sprays by Each Nostril route daily   • cetirizine (ZYRTEC) 10 MG tablet Take 1 tablet by mouth daily     No current facility-administered medications for this visit.     Immunizations   Immunization History   Administered Date(s) Administered   • TDaP, ADACEL (age 10y-64y), BOOSTRIX (age 10y+), IM, 0.5mL 08/15/2022     Allergies   No Known Allergies    Objective   Vital Signs  BP 96/66 (Site: Left Upper Arm, Position: Standing, Cuff Size: Medium Adult)   Pulse 66   Temp 97.7 °F (36.5 °C) (Oral)   Resp 18   Ht 1.524 m (5')   Wt 67.1 kg (148 lb)

## 2025-01-13 SDOH — ECONOMIC STABILITY: TRANSPORTATION INSECURITY
IN THE PAST 12 MONTHS, HAS LACK OF TRANSPORTATION KEPT YOU FROM MEETINGS, WORK, OR FROM GETTING THINGS NEEDED FOR DAILY LIVING?: PATIENT DECLINED

## 2025-01-13 SDOH — ECONOMIC STABILITY: FOOD INSECURITY: WITHIN THE PAST 12 MONTHS, THE FOOD YOU BOUGHT JUST DIDN'T LAST AND YOU DIDN'T HAVE MONEY TO GET MORE.: PATIENT DECLINED

## 2025-01-13 SDOH — ECONOMIC STABILITY: INCOME INSECURITY: IN THE LAST 12 MONTHS, WAS THERE A TIME WHEN YOU WERE NOT ABLE TO PAY THE MORTGAGE OR RENT ON TIME?: PATIENT DECLINED

## 2025-01-13 SDOH — ECONOMIC STABILITY: FOOD INSECURITY: WITHIN THE PAST 12 MONTHS, YOU WORRIED THAT YOUR FOOD WOULD RUN OUT BEFORE YOU GOT MONEY TO BUY MORE.: PATIENT DECLINED

## 2025-01-13 SDOH — ECONOMIC STABILITY: TRANSPORTATION INSECURITY
IN THE PAST 12 MONTHS, HAS THE LACK OF TRANSPORTATION KEPT YOU FROM MEDICAL APPOINTMENTS OR FROM GETTING MEDICATIONS?: PATIENT DECLINED

## 2025-01-14 ENCOUNTER — OFFICE VISIT (OUTPATIENT)
Age: 35
End: 2025-01-14
Payer: MEDICAID

## 2025-01-14 VITALS
HEART RATE: 64 BPM | WEIGHT: 148 LBS | RESPIRATION RATE: 18 BRPM | BODY MASS INDEX: 29.06 KG/M2 | SYSTOLIC BLOOD PRESSURE: 112 MMHG | OXYGEN SATURATION: 97 % | HEIGHT: 60 IN | DIASTOLIC BLOOD PRESSURE: 76 MMHG | TEMPERATURE: 98.1 F

## 2025-01-14 DIAGNOSIS — J30.89 SEASONAL ALLERGIC RHINITIS DUE TO OTHER ALLERGIC TRIGGER: Primary | ICD-10-CM

## 2025-01-14 PROCEDURE — 99213 OFFICE O/P EST LOW 20 MIN: CPT

## 2025-01-14 RX ORDER — FLUTICASONE PROPIONATE 50 MCG
2 SPRAY, SUSPENSION (ML) NASAL DAILY
Qty: 16 G | Refills: 3 | Status: SHIPPED | OUTPATIENT
Start: 2025-01-14

## 2025-01-14 RX ORDER — CETIRIZINE HYDROCHLORIDE 10 MG/1
10 TABLET ORAL DAILY
Qty: 90 TABLET | Refills: 1 | Status: SHIPPED | OUTPATIENT
Start: 2025-01-14

## 2025-01-14 ASSESSMENT — ENCOUNTER SYMPTOMS
SHORTNESS OF BREATH: 0
RHINORRHEA: 1
SINUS PRESSURE: 1
COUGH: 0
WHEEZING: 0

## 2025-01-14 ASSESSMENT — PATIENT HEALTH QUESTIONNAIRE - PHQ9
SUM OF ALL RESPONSES TO PHQ QUESTIONS 1-9: 0
1. LITTLE INTEREST OR PLEASURE IN DOING THINGS: NOT AT ALL
SUM OF ALL RESPONSES TO PHQ QUESTIONS 1-9: 0
SUM OF ALL RESPONSES TO PHQ9 QUESTIONS 1 & 2: 0
2. FEELING DOWN, DEPRESSED OR HOPELESS: NOT AT ALL
SUM OF ALL RESPONSES TO PHQ QUESTIONS 1-9: 0
SUM OF ALL RESPONSES TO PHQ QUESTIONS 1-9: 0

## 2025-01-14 NOTE — PROGRESS NOTES
Patient has been identified by name and .    Chief Complaint   Patient presents with    Sinus Problem     Pt reports with sinus pressure, headache, clogged ears       Vitals:    25 1106   BP: 112/76   Site: Left Upper Arm   Position: Sitting   Cuff Size: Medium Adult   Pulse: 64   Resp: 18   Temp: 98.1 °F (36.7 °C)   TempSrc: Oral   SpO2: 97%   Weight: 67.1 kg (148 lb)   Height: 1.524 m (5')        \"Have you been to the ER, urgent care clinic since your last visit?  Hospitalized since your last visit?\"    NO    “Have you seen or consulted any other health care providers outside of [facility] since your last visit?”    NO        “Have you had a pap smear?”    NO    No cervical cancer screening on file
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
## 2025-05-01 ENCOUNTER — OFFICE VISIT (OUTPATIENT)
Age: 35
End: 2025-05-01
Payer: MEDICAID

## 2025-05-01 VITALS
BODY MASS INDEX: 29.25 KG/M2 | OXYGEN SATURATION: 98 % | DIASTOLIC BLOOD PRESSURE: 67 MMHG | SYSTOLIC BLOOD PRESSURE: 101 MMHG | HEART RATE: 63 BPM | RESPIRATION RATE: 18 BRPM | HEIGHT: 60 IN | TEMPERATURE: 97.9 F | WEIGHT: 149 LBS

## 2025-05-01 DIAGNOSIS — R07.9 CHEST PAIN, UNSPECIFIED TYPE: Primary | ICD-10-CM

## 2025-05-01 PROCEDURE — 99213 OFFICE O/P EST LOW 20 MIN: CPT

## 2025-05-01 SDOH — ECONOMIC STABILITY: FOOD INSECURITY: WITHIN THE PAST 12 MONTHS, THE FOOD YOU BOUGHT JUST DIDN'T LAST AND YOU DIDN'T HAVE MONEY TO GET MORE.: SOMETIMES TRUE

## 2025-05-01 SDOH — ECONOMIC STABILITY: INCOME INSECURITY: IN THE LAST 12 MONTHS, WAS THERE A TIME WHEN YOU WERE NOT ABLE TO PAY THE MORTGAGE OR RENT ON TIME?: YES

## 2025-05-01 SDOH — ECONOMIC STABILITY: FOOD INSECURITY: WITHIN THE PAST 12 MONTHS, YOU WORRIED THAT YOUR FOOD WOULD RUN OUT BEFORE YOU GOT MONEY TO BUY MORE.: SOMETIMES TRUE

## 2025-05-01 SDOH — ECONOMIC STABILITY: TRANSPORTATION INSECURITY
IN THE PAST 12 MONTHS, HAS THE LACK OF TRANSPORTATION KEPT YOU FROM MEDICAL APPOINTMENTS OR FROM GETTING MEDICATIONS?: NO

## 2025-05-01 NOTE — PROGRESS NOTES
[facility]   [address] [place] [address]   Office [phone], Fax [phone]      Subjective   Refugio Blackwood is a 34 y.o. female who presents for Follow-Up from Hospital (Pt reports for F/U on chest pain)      HPI  - Here to discuss recent ER visit for chest pain  - work up was completely negative at ER  - CP has resolved  - she is going to be following up with Cardiologist  - no return of symptoms since ER visit  - says she is under a lot of stress working two jobs, 7 days per week    Review of Systems   Review of Systems   Cardiovascular:  Negative for chest pain and palpitations.     Medical History  Past Medical History:   Diagnosis Date    Heart palpitations      Medications  Current Outpatient Medications   Medication Sig    cetirizine (ZYRTEC) 10 MG tablet Take 1 tablet by mouth daily    fluticasone (FLONASE) 50 MCG/ACT nasal spray 2 sprays by Each Nostril route daily    sodium chloride (OCEAN, BABY AYR) 0.65 % nasal spray 1 spray by Nasal route as needed for Congestion (dry nose, congestion)    acetaminophen (TYLENOL) 500 MG tablet Take 1 tablet by mouth every 6 hours as needed for Pain As needed     No current facility-administered medications for this visit.     Immunizations   Immunization History   Administered Date(s) Administered    TDaP, ADACEL (age 10y-64y), BOOSTRIX (age 10y+), IM, 0.5mL 08/15/2022     Allergies   No Known Allergies    Objective   Vital Signs  /67 (BP Site: Left Upper Arm, Patient Position: Sitting, BP Cuff Size: Medium Adult)   Pulse 63   Temp 97.9 °F (36.6 °C) (Oral)   Resp 18   Ht 1.524 m (5')   Wt 67.6 kg (149 lb)   SpO2 98%   BMI 29.10 kg/m²     Physical Exam  Constitutional:       Appearance: Normal appearance.   Cardiovascular:      Rate and Rhythm: Normal rate and regular rhythm.      Pulses: Normal pulses.      Heart sounds: Normal heart sounds.   Pulmonary:      Effort: Pulmonary effort is normal.

## 2025-05-01 NOTE — PROGRESS NOTES
Identified pt with two pt identifiers(name and ). Reviewed record in preparation for visit and have obtained necessary documentation.  Chief Complaint   Patient presents with    Follow-Up from Hospital     Pt reports for F/U on chest pain        Health Maintenance Due   Topic    Varicella vaccine (1 of 2 - 13+ 2-dose series)    Hepatitis B vaccine (1 of 3 - 19+ 3-dose series)    Cervical cancer screen     COVID-19 Vaccine ( season)       Vitals:    25 1812   BP: 101/67   BP Site: Left Upper Arm   Patient Position: Sitting   BP Cuff Size: Medium Adult   Pulse: 63   Resp: 18   Temp: 97.9 °F (36.6 °C)   TempSrc: Oral   SpO2: 98%   Weight: 67.6 kg (149 lb)   Height: 1.524 m (5')         \"Have you been to the ER, urgent care clinic since your last visit?  Hospitalized since your last visit?\"    YES - When: approximately 8 days ago.  Where and Why: [facility] for Angina.    “Have you seen or consulted any other health care providers outside of [facility] since your last visit?”    NO     “Have you had a pap smear?”    NO    No cervical cancer screening on file       Click Here for Release of Records Request     This patient is accompanied in the office by her mother.  I have received verbal consent from Refugio Blackwood to discuss any/all medical information while they are present in the room.

## 2025-06-13 ENCOUNTER — TELEPHONE (OUTPATIENT)
Age: 35
End: 2025-06-13
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
## 2025-07-14 ENCOUNTER — OFFICE VISIT (OUTPATIENT)
Age: 35
End: 2025-07-14
Payer: MEDICAID

## 2025-07-14 VITALS
DIASTOLIC BLOOD PRESSURE: 62 MMHG | OXYGEN SATURATION: 99 % | RESPIRATION RATE: 17 BRPM | SYSTOLIC BLOOD PRESSURE: 95 MMHG | HEART RATE: 69 BPM | TEMPERATURE: 98.2 F | BODY MASS INDEX: 27.48 KG/M2 | WEIGHT: 140 LBS | HEIGHT: 60 IN

## 2025-07-14 DIAGNOSIS — Z86.2 HISTORY OF ANEMIA: ICD-10-CM

## 2025-07-14 DIAGNOSIS — Z86.39 HX OF HYPERLIPIDEMIA: ICD-10-CM

## 2025-07-14 DIAGNOSIS — G47.00 INSOMNIA, UNSPECIFIED TYPE: ICD-10-CM

## 2025-07-14 DIAGNOSIS — R53.83 FATIGUE, UNSPECIFIED TYPE: Primary | ICD-10-CM

## 2025-07-14 DIAGNOSIS — Z11.3 SCREEN FOR STD (SEXUALLY TRANSMITTED DISEASE): ICD-10-CM

## 2025-07-14 DIAGNOSIS — R42 DIZZY SPELLS: ICD-10-CM

## 2025-07-14 PROCEDURE — 99203 OFFICE O/P NEW LOW 30 MIN: CPT

## 2025-07-14 ASSESSMENT — PATIENT HEALTH QUESTIONNAIRE - PHQ9
SUM OF ALL RESPONSES TO PHQ QUESTIONS 1-9: 0
SUM OF ALL RESPONSES TO PHQ QUESTIONS 1-9: 0
1. LITTLE INTEREST OR PLEASURE IN DOING THINGS: NOT AT ALL
2. FEELING DOWN, DEPRESSED OR HOPELESS: NOT AT ALL
SUM OF ALL RESPONSES TO PHQ QUESTIONS 1-9: 0
SUM OF ALL RESPONSES TO PHQ QUESTIONS 1-9: 0

## 2025-07-14 NOTE — PROGRESS NOTES
Roomed by name and .    Chief Complaint   Patient presents with    Dizziness    Exposure to STD        Vitals:    25 1611   BP: 95/62   Pulse: 69   Resp: 17   Temp: 98.2 °F (36.8 °C)   TempSrc: Temporal   SpO2: 99%   Weight: 63.5 kg (140 lb)   Height: 1.524 m (5')          \"Have you been to the ER, urgent care clinic since your last visit?  Hospitalized since your last visit?\"    NO    “Have you seen or consulted any other health care providers outside of [facility] since your last visit?”    Yes.  [facility] for dizziness and fatigue.                     Click Here for Release of Records Request

## 2025-07-16 LAB
ALBUMIN SERPL-MCNC: 4.4 G/DL (ref 3.5–5)
ALBUMIN/GLOB SERPL: 1.4 (ref 1.1–2.2)
ALP SERPL-CCNC: 68 U/L (ref 45–117)
ALT SERPL-CCNC: 17 U/L (ref 12–78)
ANION GAP SERPL CALC-SCNC: 6 MMOL/L (ref 2–12)
AST SERPL-CCNC: 10 U/L (ref 15–37)
BILIRUB SERPL-MCNC: 0.5 MG/DL (ref 0.2–1)
BUN SERPL-MCNC: 9 MG/DL (ref 6–20)
BUN/CREAT SERPL: 13 (ref 12–20)
CALCIUM SERPL-MCNC: 9.4 MG/DL (ref 8.5–10.1)
CHLORIDE SERPL-SCNC: 108 MMOL/L (ref 97–108)
CO2 SERPL-SCNC: 25 MMOL/L (ref 21–32)
CREAT SERPL-MCNC: 0.69 MG/DL (ref 0.55–1.02)
GLOBULIN SER CALC-MCNC: 3.2 G/DL (ref 2–4)
GLUCOSE SERPL-MCNC: 95 MG/DL (ref 65–100)
HBV SURFACE AG SER QL: 0.1 INDEX
HBV SURFACE AG SER QL: NEGATIVE
HCV AB SER IA-ACNC: 0.03 INDEX
HCV AB SERPL QL IA: NONREACTIVE
HIV 1+2 AB+HIV1 P24 AG SERPL QL IA: NONREACTIVE
HIV 1/2 RESULT COMMENT: NORMAL
POTASSIUM SERPL-SCNC: 4.1 MMOL/L (ref 3.5–5.1)
PROT SERPL-MCNC: 7.6 G/DL (ref 6.4–8.2)
RPR SER QL: NONREACTIVE
SODIUM SERPL-SCNC: 139 MMOL/L (ref 136–145)
TSH SERPL DL<=0.05 MIU/L-ACNC: 1.1 UIU/ML (ref 0.36–3.74)

## 2025-07-17 LAB
25(OH)D3 SERPL-MCNC: 43.1 NG/ML (ref 30–100)
C TRACH RRNA SPEC QL NAA+PROBE: NEGATIVE
CHOLEST SERPL-MCNC: 257 MG/DL
FERRITIN SERPL-MCNC: 5 NG/ML (ref 8–252)
HDLC SERPL-MCNC: 73 MG/DL
HDLC SERPL: 3.5 (ref 0–5)
IRON SATN MFR SERPL: 12 % (ref 20–50)
IRON SERPL-MCNC: 51 UG/DL (ref 35–150)
LDLC SERPL CALC-MCNC: 169.8 MG/DL (ref 0–100)
N GONORRHOEA RRNA SPEC QL NAA+PROBE: NEGATIVE
SPECIMEN SOURCE: NORMAL
T VAGINALIS RRNA SPEC QL NAA+PROBE: NEGATIVE
TIBC SERPL-MCNC: 441 UG/DL (ref 250–450)
TRIGL SERPL-MCNC: 71 MG/DL
VLDLC SERPL CALC-MCNC: 14.2 MG/DL

## 2025-07-22 ENCOUNTER — TELEPHONE (OUTPATIENT)
Age: 35
End: 2025-07-22

## 2025-07-22 DIAGNOSIS — D50.9 IRON DEFICIENCY ANEMIA, UNSPECIFIED IRON DEFICIENCY ANEMIA TYPE: Primary | ICD-10-CM

## 2025-07-22 RX ORDER — FERROUS SULFATE 325(65) MG
325 TABLET ORAL EVERY OTHER DAY
Qty: 45 TABLET | Refills: 3 | Status: SHIPPED | OUTPATIENT
Start: 2025-07-22

## 2025-07-22 NOTE — TELEPHONE ENCOUNTER
Pt is requesting a returned phone call to discuss her results and what it means. She stated that she is concerned because she is still not feeling well. Her contact number is [phone].

## 2025-07-29 ENCOUNTER — OFFICE VISIT (OUTPATIENT)
Age: 35
End: 2025-07-29
Payer: MEDICAID

## 2025-07-29 VITALS
DIASTOLIC BLOOD PRESSURE: 67 MMHG | HEART RATE: 87 BPM | WEIGHT: 136.8 LBS | OXYGEN SATURATION: 98 % | SYSTOLIC BLOOD PRESSURE: 104 MMHG | TEMPERATURE: 98.1 F | BODY MASS INDEX: 26.72 KG/M2

## 2025-07-29 DIAGNOSIS — J30.89 SEASONAL ALLERGIC RHINITIS DUE TO OTHER ALLERGIC TRIGGER: ICD-10-CM

## 2025-07-29 DIAGNOSIS — F41.1 GAD (GENERALIZED ANXIETY DISORDER): Primary | ICD-10-CM

## 2025-07-29 DIAGNOSIS — D50.9 IRON DEFICIENCY ANEMIA, UNSPECIFIED IRON DEFICIENCY ANEMIA TYPE: ICD-10-CM

## 2025-07-29 PROCEDURE — 99214 OFFICE O/P EST MOD 30 MIN: CPT

## 2025-07-29 RX ORDER — FERROUS SULFATE 325(65) MG
325 TABLET ORAL EVERY OTHER DAY
Qty: 45 TABLET | Refills: 3 | Status: SHIPPED | OUTPATIENT
Start: 2025-07-29

## 2025-07-29 RX ORDER — HYDROXYZINE HYDROCHLORIDE 25 MG/1
25 TABLET, FILM COATED ORAL EVERY 8 HOURS PRN
COMMUNITY

## 2025-07-29 RX ORDER — CETIRIZINE HYDROCHLORIDE 10 MG/1
10 TABLET ORAL DAILY
Qty: 90 TABLET | Refills: 3 | Status: SHIPPED | OUTPATIENT
Start: 2025-07-29

## 2025-07-29 RX ORDER — FLUOXETINE 10 MG/1
10 CAPSULE ORAL DAILY
Qty: 30 CAPSULE | Refills: 3 | Status: SHIPPED | OUTPATIENT
Start: 2025-07-29

## 2025-07-29 ASSESSMENT — PATIENT HEALTH QUESTIONNAIRE - PHQ9
SUM OF ALL RESPONSES TO PHQ QUESTIONS 1-9: 0
1. LITTLE INTEREST OR PLEASURE IN DOING THINGS: NOT AT ALL
2. FEELING DOWN, DEPRESSED OR HOPELESS: NOT AT ALL
SUM OF ALL RESPONSES TO PHQ QUESTIONS 1-9: 0

## 2025-07-29 NOTE — PROGRESS NOTES
Identified pt with two pt identifiers(name and ). Reviewed record in preparation for visit and have obtained necessary documentation.  Chief Complaint   Patient presents with    Follow-up     ED follow up for chest pain and lightheadedness. Currently lightheaded today.         Vitals:    25 1615   BP: 104/67   BP Site: Left Upper Arm   Patient Position: Sitting   BP Cuff Size: Medium Adult   Pulse: 87   Temp: 98.1 °F (36.7 °C)   TempSrc: Oral   SpO2: 98%   Weight: 62.1 kg (136 lb 12.8 oz)         Coordination of Care Questionnaire:  :     \"Have you been to the ER, urgent care clinic since your last visit?  Hospitalized since your last visit?\"    YES - When: approximately 1  weeks ago.  Where and Why: Non cardiac chest pain.    “Have you seen or consulted any other health care providers outside of [facility] since your last visit?”    NO     “Have you had a pap smear?”    NO    No cervical cancer screening on file             Click Here for Release of Records Request

## 2025-07-30 NOTE — PROGRESS NOTES
Refugio Blackwood (:  1990) is a 35 y.o. female,Established patient, here for evaluation of the following chief complaint(s):  Follow-up (ED follow up for chest pain and lightheadedness. Currently lightheaded today. )         Assessment & Plan  ANTONIO (generalized anxiety disorder)   New, not at goal (unstable),    - Severe anxiety and associated depression which has resulted in multiple ED visits with signs of panic attack (chest palpiations etc, negative workup), denies any SI/HI/AVH  - Will start Fluoxetine 10mg daily, can use hydroxyzine as needed for abortive measures/sleep at night  - Will follow-up in 1 month  Iron deficiency anemia, unspecified iron deficiency anemia type   Chronic, worsening (exacerbation),    - Endorses chronic fatigue, paleness with no acute signs and symptoms of blood loss. Labs reviewed, low ferritin and iron % saturation likely due to iron deficiency anemia. TSH wnl.  - Advised the patient to increase intake of foods high in iron (spinach, beans, green leafy vegetables)  - Will start iron supplementation daily (advised to take with Vitamin C/multivitamins)  Orders:    ferrous sulfate (IRON 325) 325 (65 Fe) MG tablet; Take 1 tablet by mouth every other day    Seasonal allergic rhinitis due to other allergic trigger     - Does note maxillary pressure worse in the morning, prior prescription of flonase and ceterizine which the patient is currently not taking, pt obtained a CT max sinus at her most recent ED visit which was reviewed - indicating expected mucosal thickening   - Advised the patient to resume taking cetirizine 10mg at night, can take BID if needed, avoid operating machinery or driving due to risk for drowsiness  - Can resume flonase daily for 2 weeks with 3 days holiday to prevent nasal mucosal atrophy  Orders:    cetirizine (ZYRTEC) 10 MG tablet; Take 1 tablet by mouth daily      No follow-ups on file.       Subjective   Patient presents today for an ED follow-up. She

## 2025-07-30 NOTE — ASSESSMENT & PLAN NOTE
New, not at goal (unstable),    - Severe anxiety and associated depression which has resulted in multiple ED visits with signs of panic attack (chest palpiations etc, negative workup), denies any SI/HI/AVH  - Will start Fluoxetine 10mg daily, can use hydroxyzine as needed for abortive measures/sleep at night  - Will follow-up in 1 month

## 2025-08-11 ENCOUNTER — TELEPHONE (OUTPATIENT)
Age: 35
End: 2025-08-11

## 2025-08-26 ENCOUNTER — OFFICE VISIT (OUTPATIENT)
Age: 35
End: 2025-08-26
Payer: MEDICAID

## 2025-08-26 VITALS
HEIGHT: 60 IN | HEART RATE: 62 BPM | BODY MASS INDEX: 27.29 KG/M2 | SYSTOLIC BLOOD PRESSURE: 95 MMHG | RESPIRATION RATE: 18 BRPM | WEIGHT: 139 LBS | OXYGEN SATURATION: 98 % | TEMPERATURE: 97.6 F | DIASTOLIC BLOOD PRESSURE: 62 MMHG

## 2025-08-26 DIAGNOSIS — R53.83 FATIGUE, UNSPECIFIED TYPE: ICD-10-CM

## 2025-08-26 DIAGNOSIS — D50.9 IRON DEFICIENCY ANEMIA, UNSPECIFIED IRON DEFICIENCY ANEMIA TYPE: ICD-10-CM

## 2025-08-26 DIAGNOSIS — G47.9 SLEEP DIFFICULTIES: ICD-10-CM

## 2025-08-26 DIAGNOSIS — F41.1 GAD (GENERALIZED ANXIETY DISORDER): Primary | ICD-10-CM

## 2025-08-26 DIAGNOSIS — R42 LIGHTHEADEDNESS: ICD-10-CM

## 2025-08-26 PROCEDURE — 99213 OFFICE O/P EST LOW 20 MIN: CPT

## 2025-08-26 RX ORDER — FLUOXETINE 10 MG/1
10 CAPSULE ORAL 2 TIMES DAILY
Qty: 180 CAPSULE | Refills: 3 | Status: SHIPPED | OUTPATIENT
Start: 2025-08-26

## 2025-08-26 ASSESSMENT — PATIENT HEALTH QUESTIONNAIRE - PHQ9
2. FEELING DOWN, DEPRESSED OR HOPELESS: NOT AT ALL
2. FEELING DOWN, DEPRESSED OR HOPELESS: NOT AT ALL
SUM OF ALL RESPONSES TO PHQ QUESTIONS 1-9: 0
7. TROUBLE CONCENTRATING ON THINGS, SUCH AS READING THE NEWSPAPER OR WATCHING TELEVISION: SEVERAL DAYS
8. MOVING OR SPEAKING SO SLOWLY THAT OTHER PEOPLE COULD HAVE NOTICED. OR THE OPPOSITE, BEING SO FIGETY OR RESTLESS THAT YOU HAVE BEEN MOVING AROUND A LOT MORE THAN USUAL: NOT AT ALL
10. IF YOU CHECKED OFF ANY PROBLEMS, HOW DIFFICULT HAVE THESE PROBLEMS MADE IT FOR YOU TO DO YOUR WORK, TAKE CARE OF THINGS AT HOME, OR GET ALONG WITH OTHER PEOPLE: SOMEWHAT DIFFICULT
SUM OF ALL RESPONSES TO PHQ QUESTIONS 1-9: 0
SUM OF ALL RESPONSES TO PHQ QUESTIONS 1-9: 10
SUM OF ALL RESPONSES TO PHQ QUESTIONS 1-9: 0
SUM OF ALL RESPONSES TO PHQ QUESTIONS 1-9: 10
SUM OF ALL RESPONSES TO PHQ QUESTIONS 1-9: 10
1. LITTLE INTEREST OR PLEASURE IN DOING THINGS: NOT AT ALL
9. THOUGHTS THAT YOU WOULD BE BETTER OFF DEAD, OR OF HURTING YOURSELF: NOT AT ALL
6. FEELING BAD ABOUT YOURSELF - OR THAT YOU ARE A FAILURE OR HAVE LET YOURSELF OR YOUR FAMILY DOWN: NOT AT ALL
4. FEELING TIRED OR HAVING LITTLE ENERGY: NEARLY EVERY DAY
SUM OF ALL RESPONSES TO PHQ QUESTIONS 1-9: 0
5. POOR APPETITE OR OVEREATING: NEARLY EVERY DAY
SUM OF ALL RESPONSES TO PHQ QUESTIONS 1-9: 10
3. TROUBLE FALLING OR STAYING ASLEEP: NEARLY EVERY DAY
1. LITTLE INTEREST OR PLEASURE IN DOING THINGS: NOT AT ALL